# Patient Record
Sex: FEMALE | Employment: OTHER | URBAN - METROPOLITAN AREA
[De-identification: names, ages, dates, MRNs, and addresses within clinical notes are randomized per-mention and may not be internally consistent; named-entity substitution may affect disease eponyms.]

---

## 2017-01-13 DIAGNOSIS — M51.360 LUMBAR DISCOGENIC PAIN SYNDROME: ICD-10-CM

## 2017-01-13 DIAGNOSIS — I10 HYPERTENSION GOAL BP (BLOOD PRESSURE) < 140/90: ICD-10-CM

## 2017-01-13 DIAGNOSIS — E78.5 HYPERLIPIDEMIA LDL GOAL <100: Primary | ICD-10-CM

## 2017-01-13 RX ORDER — PRAVASTATIN SODIUM 40 MG
40 TABLET ORAL DAILY
Qty: 90 TABLET | Refills: 3 | Status: SHIPPED | OUTPATIENT
Start: 2017-01-13 | End: 2018-01-08

## 2017-01-13 RX ORDER — METOPROLOL SUCCINATE 200 MG/1
200 TABLET, EXTENDED RELEASE ORAL DAILY
Qty: 90 TABLET | Refills: 3 | Status: SHIPPED | OUTPATIENT
Start: 2017-01-13 | End: 2018-01-08

## 2017-01-13 RX ORDER — GABAPENTIN 100 MG/1
100 CAPSULE ORAL AT BEDTIME
Qty: 90 CAPSULE | Refills: 3 | Status: SHIPPED | OUTPATIENT
Start: 2017-01-13 | End: 2018-01-08

## 2017-01-13 NOTE — TELEPHONE ENCOUNTER
Gabapentin 100mg      Last Written Prescription Date:  11/02/15  Last Fill Quantity: 90,   # refills: 3  Last Office Visit with [facility], [facility] or [employer] prescribing provider: 06/20/16  Future Office visit:       Routing refill request to provider for review/approval because:  Drug not on the [facility], [facility] or [employer] refill protocol or controlled substance    Metoprolol 200mg      Last Written Prescription Date: 11/02/15  Last Fill Quantity: 90, # refills: 3    Last Office Visit with [facility], [facility] or [employer] prescribing provider:  06/20/16   Future Office Visit:        BP Readings from Last 3 Encounters:   06/20/16 128/77   02/01/16 128/76   11/02/15 138/88       Pravastatin 40mg     Last Written Prescription Date: 11/02/15  Last Fill Quantity: 90, # refills: 3  Last Office Visit with [employer], [facility] or [employer] prescribing provider: 06/20/16       CHOL      170   11/2/2015  HDL       45   11/2/2015  LDL       73   11/2/2015  TRIG      258   11/2/2015  CHOLHDLRATIO      3.8   11/2/2015        Thank you -  Gelacio Curry, Pharmacy Technician  [facility]  On Behalf Of [facility]

## 2017-03-06 DIAGNOSIS — I10 HYPERTENSION GOAL BP (BLOOD PRESSURE) < 140/90: ICD-10-CM

## 2017-03-06 NOTE — TELEPHONE ENCOUNTER
losartan-hydrochlorothiazide (HYZAAR) 100-12.5 MG per tablet      Last Written Prescription Date: 12/9/16  Last Fill Quantity: 90, # refills: 0  Last Office Visit with [facility], [facility] or [facility] prescribing provider: 6/20/16       Potassium   Date Value Ref Range Status   06/20/2016 4.4 3.4 - 5.3 mmol/L Final     Creatinine   Date Value Ref Range Status   06/20/2016 0.56 0.52 - 1.04 mg/dL Final     BP Readings from Last 3 Encounters:   06/20/16 128/77   02/01/16 128/76   11/02/15 138/88

## 2017-03-08 RX ORDER — LOSARTAN POTASSIUM AND HYDROCHLOROTHIAZIDE 12.5; 1 MG/1; MG/1
TABLET ORAL
Qty: 90 TABLET | Refills: 0 | Status: SHIPPED | OUTPATIENT
Start: 2017-03-08 | End: 2017-03-28

## 2017-03-20 ENCOUNTER — TELEPHONE (OUTPATIENT)
Dept: FAMILY MEDICINE | Facility: CLINIC | Age: 82
End: 2017-03-20

## 2017-03-20 DIAGNOSIS — R05.9 COUGH: ICD-10-CM

## 2017-03-20 RX ORDER — NORTRIPTYLINE HCL 25 MG
25 CAPSULE ORAL 2 TIMES DAILY
Qty: 180 CAPSULE | Refills: 3 | Status: CANCELLED | OUTPATIENT
Start: 2017-03-20

## 2017-03-20 NOTE — TELEPHONE ENCOUNTER
I did tell patient that Dr. Morgan has retired and that she might need to be seen for her medication, but am hoping you can refill one more time. Thanks  Nortriptyline    Last Written Prescription Date: 2/1/16  Last Fill Quantity: 90, # refills: 3  Last Office Visit with [facility] or [facility] prescribing provider: 6/20/16        BP Readings from Last 3 Encounters:   06/20/16 128/77   02/01/16 128/76   11/02/15 138/88     Pulse: (for Fetzima)  Creatinine   Date Value Ref Range Status   06/20/2016 0.56 0.52 - 1.04 mg/dL Final   ]    Last PHQ-9 score on record=   PHQ-9 SCORE 11/19/2012   Total Score 3     Thanks!  [facility]  [phone]

## 2017-03-20 NOTE — TELEPHONE ENCOUNTER
nortriptyline (PAMELOR) 25 MG capsule     Last Written Prescription Date: 2-1-16  Last Fill Quantity: 180, # refills: 3  Last Office Visit with [facility], [facility] or [facility] prescribing provider: 6-20-16        BP Readings from Last 3 Encounters:   06/20/16 128/77   02/01/16 128/76   11/02/15 138/88     Last PHQ-9 score on record=   PHQ-9 SCORE 11/19/2012   Total Score 3

## 2017-03-22 RX ORDER — NORTRIPTYLINE HCL 25 MG
25 CAPSULE ORAL 2 TIMES DAILY
Qty: 30 CAPSULE | Refills: 0 | Status: SHIPPED | OUTPATIENT
Start: 2017-03-22 | End: 2017-03-28

## 2017-03-22 NOTE — TELEPHONE ENCOUNTER
Duplicate request.   Cynthia Gant RN   March 22, 2017 11:04 AM  [facility]   [phone]

## 2017-03-22 NOTE — TELEPHONE ENCOUNTER
Pt needs to be seen for establish care with new provider and f/u on chronic medical conditions.    Medication refilled for 15 days.      Jennifer Nava MD.   Family Physician.  [facility].

## 2017-03-28 ENCOUNTER — OFFICE VISIT (OUTPATIENT)
Dept: FAMILY MEDICINE | Facility: CLINIC | Age: 82
End: 2017-03-28
Payer: COMMERCIAL

## 2017-03-28 VITALS
TEMPERATURE: 97.9 F | HEART RATE: 116 BPM | HEIGHT: 56 IN | WEIGHT: 150 LBS | BODY MASS INDEX: 33.74 KG/M2 | DIASTOLIC BLOOD PRESSURE: 89 MMHG | SYSTOLIC BLOOD PRESSURE: 139 MMHG | OXYGEN SATURATION: 97 %

## 2017-03-28 DIAGNOSIS — E78.5 HYPERLIPIDEMIA LDL GOAL <100: ICD-10-CM

## 2017-03-28 DIAGNOSIS — E11.9 TYPE 2 DIABETES MELLITUS WITHOUT COMPLICATION, WITHOUT LONG-TERM CURRENT USE OF INSULIN (H): ICD-10-CM

## 2017-03-28 DIAGNOSIS — Z91.89 PNEUMOCOCCAL VACCINATION INDICATED: ICD-10-CM

## 2017-03-28 DIAGNOSIS — R20.0 NUMBNESS OF LEFT HAND: ICD-10-CM

## 2017-03-28 DIAGNOSIS — R53.83 FATIGUE, UNSPECIFIED TYPE: ICD-10-CM

## 2017-03-28 DIAGNOSIS — I10 HYPERTENSION GOAL BP (BLOOD PRESSURE) < 140/90: Primary | ICD-10-CM

## 2017-03-28 DIAGNOSIS — R05.9 COUGH: ICD-10-CM

## 2017-03-28 LAB
ALBUMIN SERPL-MCNC: 3.7 G/DL (ref 3.4–5)
ALP SERPL-CCNC: 86 U/L (ref 40–150)
ALT SERPL W P-5'-P-CCNC: 36 U/L (ref 0–50)
ANION GAP SERPL CALCULATED.3IONS-SCNC: 6 MMOL/L (ref 3–14)
AST SERPL W P-5'-P-CCNC: 24 U/L (ref 0–45)
BASOPHILS # BLD AUTO: 0 10E9/L (ref 0–0.2)
BASOPHILS NFR BLD AUTO: 0.1 %
BILIRUB SERPL-MCNC: 0.4 MG/DL (ref 0.2–1.3)
BUN SERPL-MCNC: 17 MG/DL (ref 7–30)
CALCIUM SERPL-MCNC: 9.5 MG/DL (ref 8.5–10.1)
CHLORIDE SERPL-SCNC: 102 MMOL/L (ref 94–109)
CHOLEST SERPL-MCNC: 193 MG/DL
CO2 SERPL-SCNC: 30 MMOL/L (ref 20–32)
CREAT SERPL-MCNC: 0.6 MG/DL (ref 0.52–1.04)
CREAT UR-MCNC: 106 MG/DL
DIFFERENTIAL METHOD BLD: ABNORMAL
EOSINOPHIL # BLD AUTO: 0.1 10E9/L (ref 0–0.7)
EOSINOPHIL NFR BLD AUTO: 1.5 %
ERYTHROCYTE [DISTWIDTH] IN BLOOD BY AUTOMATED COUNT: 18.2 % (ref 10–15)
GFR SERPL CREATININE-BSD FRML MDRD: ABNORMAL ML/MIN/1.7M2
GLUCOSE SERPL-MCNC: 138 MG/DL (ref 70–99)
HBA1C MFR BLD: 6.7 % (ref 4.3–6)
HCT VFR BLD AUTO: 40.2 % (ref 35–47)
HDLC SERPL-MCNC: 50 MG/DL
HGB BLD-MCNC: 13.5 G/DL (ref 11.7–15.7)
LDLC SERPL CALC-MCNC: 92 MG/DL
LYMPHOCYTES # BLD AUTO: 2.7 10E9/L (ref 0.8–5.3)
LYMPHOCYTES NFR BLD AUTO: 36.3 %
MCH RBC QN AUTO: 21.8 PG (ref 26.5–33)
MCHC RBC AUTO-ENTMCNC: 33.6 G/DL (ref 31.5–36.5)
MCV RBC AUTO: 65 FL (ref 78–100)
MICROALBUMIN UR-MCNC: 16 MG/L
MICROALBUMIN/CREAT UR: 14.62 MG/G CR (ref 0–25)
MONOCYTES # BLD AUTO: 0.6 10E9/L (ref 0–1.3)
MONOCYTES NFR BLD AUTO: 7.9 %
NEUTROPHILS # BLD AUTO: 4.1 10E9/L (ref 1.6–8.3)
NEUTROPHILS NFR BLD AUTO: 54.2 %
NONHDLC SERPL-MCNC: 143 MG/DL
PLATELET # BLD AUTO: 314 10E9/L (ref 150–450)
POTASSIUM SERPL-SCNC: 4.3 MMOL/L (ref 3.4–5.3)
PROT SERPL-MCNC: 7.9 G/DL (ref 6.8–8.8)
RBC # BLD AUTO: 6.2 10E12/L (ref 3.8–5.2)
SODIUM SERPL-SCNC: 138 MMOL/L (ref 133–144)
TRIGL SERPL-MCNC: 254 MG/DL
TSH SERPL DL<=0.005 MIU/L-ACNC: 3.7 MU/L (ref 0.4–4)
WBC # BLD AUTO: 7.5 10E9/L (ref 4–11)

## 2017-03-28 PROCEDURE — 36415 COLL VENOUS BLD VENIPUNCTURE: CPT | Performed by: FAMILY MEDICINE

## 2017-03-28 PROCEDURE — 85025 COMPLETE CBC W/AUTO DIFF WBC: CPT | Performed by: FAMILY MEDICINE

## 2017-03-28 PROCEDURE — 80053 COMPREHEN METABOLIC PANEL: CPT | Performed by: FAMILY MEDICINE

## 2017-03-28 PROCEDURE — 84443 ASSAY THYROID STIM HORMONE: CPT | Performed by: FAMILY MEDICINE

## 2017-03-28 PROCEDURE — 82043 UR ALBUMIN QUANTITATIVE: CPT | Performed by: FAMILY MEDICINE

## 2017-03-28 PROCEDURE — 99214 OFFICE O/P EST MOD 30 MIN: CPT | Mod: 25 | Performed by: FAMILY MEDICINE

## 2017-03-28 PROCEDURE — 99207 C FOOT EXAM  NO CHARGE: CPT | Mod: 25 | Performed by: FAMILY MEDICINE

## 2017-03-28 PROCEDURE — G0009 ADMIN PNEUMOCOCCAL VACCINE: HCPCS | Performed by: FAMILY MEDICINE

## 2017-03-28 PROCEDURE — 80061 LIPID PANEL: CPT | Performed by: FAMILY MEDICINE

## 2017-03-28 PROCEDURE — 90670 PCV13 VACCINE IM: CPT | Performed by: FAMILY MEDICINE

## 2017-03-28 PROCEDURE — 83036 HEMOGLOBIN GLYCOSYLATED A1C: CPT | Performed by: FAMILY MEDICINE

## 2017-03-28 RX ORDER — LOSARTAN POTASSIUM AND HYDROCHLOROTHIAZIDE 12.5; 1 MG/1; MG/1
TABLET ORAL
Qty: 90 TABLET | Refills: 1 | Status: SHIPPED | OUTPATIENT
Start: 2017-03-28 | End: 2017-12-04

## 2017-03-28 ASSESSMENT — PAIN SCALES - GENERAL: PAINLEVEL: NO PAIN (0)

## 2017-03-28 NOTE — PROGRESS NOTES
SUBJECTIVE:                                                    David Ng is a 82 year old female who presents to clinic today for the following health issues:       Establish care         Problem list and histories reviewed & adjusted, as indicated.  Additional history: as documented         Reviewed and updated as needed this visit by clinical staff  Tobacco  Allergies  Meds  Problems  Med Hx  Surg Hx  Fam Hx  Soc Hx        Reviewed and updated as needed this visit by Provider            No chest pain/ breathing problems    Taking the nortriptyline for cough, not helping at all    Been on it for 2 years    No heartburn/ acid    No walking  / exercise    Patient occasionally gets some left hand/ arm numbness.  Never with exertion        Physical Exam   Constitutional: She is oriented to person, place, and time and well-developed, well-nourished, and in no distress. No distress.   HENT:   Head: Normocephalic and atraumatic.   Neck: Carotid bruit is not present.   Cardiovascular: Normal rate, regular rhythm, normal heart sounds and intact distal pulses.  Exam reveals no gallop and no friction rub.    No murmur heard.  Pulmonary/Chest: Effort normal and breath sounds normal.   Abdominal: Soft. There is no tenderness.   Musculoskeletal: She exhibits no edema.   Neurological: She is alert and oriented to person, place, and time.   Skin: She is not diaphoretic.   Psychiatric: Mood and affect normal.     Good radial pulses    Neg phalens and tinels    Good sensation and strength in both distal arms    Neck range of motion okay, no radiculopathy       ASSESSMENT / PLAN:  (I10) Hypertension goal BP (blood pressure) < 140/90  (primary encounter diagnosis)  Comment: barely at goal.   Plan: losartan-hydrochlorothiazide (HYZAAR) 100-12.5         MG per tablet        Stay on same meds. Also advised some increase in exercise/ walking     (E11.9) Type 2 diabetes mellitus without complication, without long-term current  use of insulin (H)  Comment: check lab.  On metformin  Plan: FOOT EXAM, OPTOMETRY REFERRAL, Albumin Random         Urine Quantitative, Hemoglobin A1c             (Z91.89) Pneumococcal vaccination indicated  Comment: patient okay with getting this   Plan: PNEUMOCOCCAL CONJ VACCINE 13 VALENT IM, VACCINE        ADMINISTRATION, INITIAL             (R53.83) Fatigue, unspecified type  Comment: check   Plan: TSH with free T4 reflex, CBC with platelets         differential             (E78.5) Hyperlipidemia LDL goal <100  Comment: fasting today   Plan: Lipid panel reflex to direct LDL, Comprehensive        metabolic panel             (R20.8) Numbness of left hand  Comment: exam reassuring.  May just be positional decrease in blood flow as holding elbow in extreme flexion sets it off   Plan: monitor     (R05) Cough  Comment: the nortriptyline did not help this at all; will dc this   Plan: dry mouth may have something to do with cough.  Monitor symptoms.  Stay well hydrated       I reviewed the patient's medications, allergies, medical history, family history, and social history.    Jayden Munoz MD

## 2017-03-28 NOTE — LETTER
[facility]  [address] [place]  [address]  [phone]        March 29, 2017    David Ng  [address] [place] [address]        Dear David,    Your labs are all very stable.  Stay on same medications.     Results for orders placed or performed in visit on 03/28/17   Albumin Random Urine Quantitative   Result Value Ref Range    Creatinine Urine 106 mg/dL    Albumin Urine mg/L 16 mg/L    Albumin Urine mg/g Cr 14.62 0 - 25 mg/g Cr   Lipid panel reflex to direct LDL   Result Value Ref Range    Cholesterol 193 <200 mg/dL    Triglycerides 254 (H) <150 mg/dL    HDL Cholesterol 50 >49 mg/dL    LDL Cholesterol Calculated 92 <100 mg/dL    Non HDL Cholesterol 143 (H) <130 mg/dL   Comprehensive metabolic panel   Result Value Ref Range    Sodium 138 133 - 144 mmol/L    Potassium 4.3 3.4 - 5.3 mmol/L    Chloride 102 94 - 109 mmol/L    Carbon Dioxide 30 20 - 32 mmol/L    Anion Gap 6 3 - 14 mmol/L    Glucose 138 (H) 70 - 99 mg/dL    Urea Nitrogen 17 7 - 30 mg/dL    Creatinine 0.60 0.52 - 1.04 mg/dL    GFR Estimate >90  Non  GFR Calc   >60 mL/min/1.7m2    GFR Estimate If Black >90   GFR Calc   >60 mL/min/1.7m2    Calcium 9.5 8.5 - 10.1 mg/dL    Bilirubin Total 0.4 0.2 - 1.3 mg/dL    Albumin 3.7 3.4 - 5.0 g/dL    Protein Total 7.9 6.8 - 8.8 g/dL    Alkaline Phosphatase 86 40 - 150 U/L    ALT 36 0 - 50 U/L    AST 24 0 - 45 U/L   TSH with free T4 reflex   Result Value Ref Range    TSH 3.70 0.40 - 4.00 mU/L   CBC with platelets differential   Result Value Ref Range    WBC 7.5 4.0 - 11.0 10e9/L    RBC Count 6.20 (H) 3.8 - 5.2 10e12/L    Hemoglobin 13.5 11.7 - 15.7 g/dL    Hematocrit 40.2 35.0 - 47.0 %    MCV 65 (L) 78 - 100 fl    MCH 21.8 (L) 26.5 - 33.0 pg    MCHC 33.6 31.5 - 36.5 g/dL    RDW 18.2 (H) 10.0 - 15.0 %    Platelet Count 314 150 - 450 10e9/L    Diff Method Automated Method     % Neutrophils 54.2 %    % Lymphocytes 36.3 %    %  Monocytes 7.9 %    % Eosinophils 1.5 %    % Basophils 0.1 %    Absolute Neutrophil 4.1 1.6 - 8.3 10e9/L    Absolute Lymphocytes 2.7 0.8 - 5.3 10e9/L    Absolute Monocytes 0.6 0.0 - 1.3 10e9/L    Absolute Eosinophils 0.1 0.0 - 0.7 10e9/L    Absolute Basophils 0.0 0.0 - 0.2 10e9/L   Hemoglobin A1c   Result Value Ref Range    Hemoglobin A1C 6.7 (H) 4.3 - 6.0 %       If you have any questions please call the clinic at [phone].    Sincerely,    Jayden BHAGATL

## 2017-03-28 NOTE — NURSING NOTE
"Chief Complaint   Patient presents with     [facility]     Health Maintenance       Initial /89 (BP Location: Left arm, Patient Position: Chair, Cuff Size: Adult Regular)  Pulse 116  Temp 97.9  F (36.6  C) (Oral)  Ht 4' 7.5\" (1.41 m)  Wt 150 lb (68 kg)  SpO2 97%  Breastfeeding? No  BMI 34.24 kg/m2 Estimated body mass index is 34.24 kg/(m^2) as calculated from the following:    Height as of this encounter: 4' 7.5\" (1.41 m).    Weight as of this encounter: 150 lb (68 kg).  Medication Reconciliation: complete   Cynthia Macdonald CMA      "

## 2017-03-28 NOTE — NURSING NOTE
Screening Questionnaire for Adult Immunization   Are you sick today?   No    Do you have allergies to medications, food, a vaccine component or latex?   No    Have you ever had a serious reaction after receiving a vaccination?   No    Do you have a long-term health problem with heart disease, lung disease,  asthma, kidney disease, metabolic (e.g., diabetes), anemia, or other blood disorder?   No    Do you have cancer, leukemia,HIV/ AIDS, or any immune system problem?   No       In the past 3 months, have you taken medications that weaken your immune system, such as cortisone, prednisone, other steroids, or anticancer drugs, or have you had radiation treatments?   No    Have you had a seizure or a brain, or other nervous system problem?   No    During the past year, have you received a transfusion of blood or blood       products, or been given a  immune (gamma) globulin or an antiviral drug?   No    For women: Are you pregnant or is there a chance you could become         pregnant during the next month?   No    Have you received any vaccinations in the past 4 weeks?   No     Immunization questionnaire answers were all negative.     VIS for Pneumococcal 13 given on same date of administration.  Staff signature/Title: Vianey Bejarano MA    Patient/Patient's parent was advised to wait 20 minutes after injection(s).  Vianey Bejarano MA

## 2017-03-28 NOTE — PATIENT INSTRUCTIONS
Increase fluid intake    For the nortriptyline, go to one pill daily for one week, then go off    Continue other meds as is    We will send you lab results    Increase walking/ exercise as you are able

## 2017-03-28 NOTE — MR AVS SNAPSHOT
After Visit Summary   3/28/2017    David Ng    MRN: [id number]           Patient Information     Date Of Birth          8/24/1934        Visit Information        Provider Department      3/28/2017 8:40 AM Jayden Munoz MD [facility]        Today's Diagnoses     Pneumococcal vaccination indicated    -  1    Type 2 diabetes mellitus without complication, without long-term current use of insulin (H)        Hypertension goal BP (blood pressure) < 140/90        Fatigue, unspecified type        Hyperlipidemia LDL goal <100          Care Instructions    Increase fluid intake    For the nortriptyline, go to one pill daily for one week, then go off    Continue other meds as is    We will send you lab results    Increase walking/ exercise as you are able        Follow-ups after your visit        Additional Services     OPTOMETRY REFERRAL       Your provider has referred you to:  [facility]: [facility] Alex Shine [phone]   [email or web address]      Please be aware that coverage of these services is subject to the terms and limitations of your health insurance plan.  Call member services at your health plan with any benefit or coverage questions.      Please bring the following to your appointment:  >>   Any x-rays, CTs or MRIs which have been performed.  Contact the facility where they were done to arrange for  prior to your scheduled appointment.  Any new CT, MRI or other procedures ordered by your specialist must be performed at a [facility] facility or coordinated by your clinic's referral office.    >>   List of current medications   >>   This referral request   >>   Any documents/labs given to you for this referral                  Who to contact     If you have questions or need follow up information about today's clinic visit or your schedule please contact [facility] directly at [phone].  Normal or  "non-critical lab and imaging results will be communicated to you by MyChart, letter or phone within 4 business days after the clinic has received the results. If you do not hear from us within 7 days, please contact the clinic through [employer] or phone. If you have a critical or abnormal lab result, we will notify you by phone as soon as possible.  Submit refill requests through Wurl or call your pharmacy and they will forward the refill request to us. Please allow 3 business days for your refill to be completed.          Additional Information About Your Visit        Wurl Information     Wurl lets you send messages to your doctor, view your test results, renew your prescriptions, schedule appointments and more. To sign up, go to [email or web address] . Click on \"Log in\" on the left side of the screen, which will take you to the Welcome page. Then click on \"Sign up Now\" on the right side of the page.     You will be asked to enter the access code listed below, as well as some personal information. Please follow the directions to create your username and password.     Your access code is: [email or web address]  Expires: 2017  9:21 AM     Your access code will  in 90 days. If you need help or a new code, please call your [facility] clinic or [phone].        Care EveryWhere ID     This is your Care EveryWhere ID. This could be used by other organizations to access your [facility] medical records  [id number]        Your Vitals Were     Pulse Temperature Height Pulse Oximetry Breastfeeding? BMI (Body Mass Index)    116 97.9  F (36.6  C) (Oral) 4' 7.5\" (1.41 m) 97% No 34.24 kg/m2       Blood Pressure from Last 3 Encounters:   17 139/89   16 128/77   16 128/76    Weight from Last 3 Encounters:   17 150 lb (68 kg)   16 146 lb (66.2 kg)   16 154 lb (69.9 kg)              We Performed the Following     Albumin Random Urine Quantitative     CBC with platelets differential     " Comprehensive metabolic panel     FOOT EXAM     Hemoglobin A1c     Lipid panel reflex to direct LDL     OPTOMETRY REFERRAL     PNEUMOCOCCAL CONJ VACCINE 13 VALENT IM     TSH with free T4 reflex     VACCINE ADMINISTRATION, INITIAL          Today's Medication Changes          These changes are accurate as of: 3/28/17  9:21 AM.  If you have any questions, ask your nurse or doctor.               These medicines have changed or have updated prescriptions.        Dose/Directions    losartan-hydrochlorothiazide 100-12.5 MG per tablet   Commonly known as:  HYZAAR   This may have changed:  See the new instructions.   Used for:  Hypertension goal BP (blood pressure) < 140/90   Changed by:  Jayden Munoz MD        1 po daily   Quantity:  90 tablet   Refills:  1         Stop taking these medicines if you haven't already. Please contact your care team if you have questions.     cyclobenzaprine 5 MG tablet   Commonly known as:  FLEXERIL   Stopped by:  Jayden Munoz MD           nortriptyline 25 MG capsule   Commonly known as:  PAMELOR   Stopped by:  Jayden Munoz MD                Where to get your medicines      These medications were sent to [facility] [address]  [address] [facility] [address]     Phone:  [phone]     losartan-hydrochlorothiazide 100-12.5 MG per tablet                Primary Care Provider Office Phone # Fax #    Jayden Munoz [phone]       [facility] [address] [facility] [address]        Thank you!     Thank you for choosing [facility]  for your care. Our goal is always to provide you with excellent care. Hearing back from our patients is one way we can continue to improve our services. Please take a few minutes to complete the written survey that you may receive in the mail after your visit with us. Thank you!             Your Updated  Medication List - Protect others around you: Learn how to safely use, store and throw away your medicines at www.disposemymeds.org.          This list is accurate as of: 3/28/17  9:21 AM.  Always use your most recent med list.                   Brand Name Dispense Instructions for use    acetaminophen 500 MG tablet    TYLENOL    100 tablet    Take 2 tablets by mouth 2 times daily.       ALEVE PO          amLODIPine 10 MG tablet    NORVASC    90 tablet    Take 1 tablet (10 mg) by mouth daily       aspirin 325 MG EC tablet      Take 1-2 tablets by mouth daily as needed.       calcium carbonate-vitamin D 600-200 MG-UNIT Tabs    calcium + D    200 tablet    Take 2 tablets by mouth daily.       cholecalciferol 400 UNIT Tabs tablet    vitamin D    100 tablet    Take 1 tablet by mouth daily.       gabapentin 100 MG capsule    NEURONTIN    90 capsule    Take 1 capsule (100 mg) by mouth At Bedtime       glucosamine 500 MG Tabs      1 TABLET DAILY       losartan-hydrochlorothiazide 100-12.5 MG per tablet    HYZAAR    90 tablet    1 po daily       metFORMIN 750 MG 24 hr tablet    GLUCOPHAGE-XR    180 tablet    1 tablet daily with dinner for 1 week, then 2 tablets daily with dinner.       metoprolol 200 MG 24 hr tablet    TOPROL-XL    90 tablet    Take 1 tablet (200 mg) by mouth daily       pravastatin 40 MG tablet    PRAVACHOL    90 tablet    Take 1 tablet (40 mg) by mouth daily

## 2017-07-27 ENCOUNTER — OFFICE VISIT (OUTPATIENT)
Dept: OPTOMETRY | Facility: CLINIC | Age: 82
End: 2017-07-27
Payer: COMMERCIAL

## 2017-07-27 DIAGNOSIS — H52.4 MYOPIA OF BOTH EYES WITH ASTIGMATISM AND PRESBYOPIA: ICD-10-CM

## 2017-07-27 DIAGNOSIS — H25.813 COMBINED FORMS OF AGE-RELATED CATARACT OF BOTH EYES: ICD-10-CM

## 2017-07-27 DIAGNOSIS — H52.203 MYOPIA OF BOTH EYES WITH ASTIGMATISM AND PRESBYOPIA: ICD-10-CM

## 2017-07-27 DIAGNOSIS — H35.363 MACULAR DRUSEN, BILATERAL: ICD-10-CM

## 2017-07-27 DIAGNOSIS — E11.9 TYPE 2 DIABETES MELLITUS WITHOUT RETINOPATHY (H): Primary | ICD-10-CM

## 2017-07-27 DIAGNOSIS — H52.13 MYOPIA OF BOTH EYES WITH ASTIGMATISM AND PRESBYOPIA: ICD-10-CM

## 2017-07-27 PROCEDURE — 92015 DETERMINE REFRACTIVE STATE: CPT | Performed by: OPTOMETRIST

## 2017-07-27 PROCEDURE — 92014 COMPRE OPH EXAM EST PT 1/>: CPT | Performed by: OPTOMETRIST

## 2017-07-27 ASSESSMENT — VISUAL ACUITY
OD_SC: 20/200
OS_CC+: -1
OS_CC: 20/120
OD_SC: 20/60
OD_CC: 20/60
OS_SC: 20/60
METHOD: SNELLEN - LINEAR
CORRECTION_TYPE: GLASSES
OS_SC: 20/80
OD_CC: 20/25
OS_SC+: -2
OS_CC: 20/30

## 2017-07-27 ASSESSMENT — CONF VISUAL FIELD
OD_NORMAL: 1
OS_NORMAL: 1

## 2017-07-27 ASSESSMENT — REFRACTION_MANIFEST
OD_AXIS: 005
OD_SPHERE: -2.25
OD_ADD: +3.00
OS_AXIS: 180
OS_ADD: +3.00
OS_SPHERE: -2.00
OD_CYLINDER: +1.50
OS_CYLINDER: +1.75

## 2017-07-27 ASSESSMENT — REFRACTION_WEARINGRX
OD_SPHERE: -2.00
OS_AXIS: 180
OD_CYLINDER: +1.50
OS_ADD: +2.75
OS_CYLINDER: +1.50
OS_SPHERE: -2.00
OD_ADD: +2.75
OD_AXIS: 180

## 2017-07-27 ASSESSMENT — SLIT LAMP EXAM - LIDS
COMMENTS: NORMAL
COMMENTS: NORMAL

## 2017-07-27 ASSESSMENT — CUP TO DISC RATIO
OD_RATIO: 0.3
OS_RATIO: 0.3

## 2017-07-27 ASSESSMENT — TONOMETRY
OS_IOP_MMHG: 14
OD_IOP_MMHG: 16
IOP_METHOD: APPLANATION

## 2017-07-27 ASSESSMENT — EXTERNAL EXAM - LEFT EYE: OS_EXAM: NORMAL

## 2017-07-27 ASSESSMENT — EXTERNAL EXAM - RIGHT EYE: OD_EXAM: NORMAL

## 2017-07-27 NOTE — PROGRESS NOTES
Chief Complaint   Patient presents with     Diabetic Eye Exam     Accompanied by self  Lab Results   Component Value Date    A1C 6.7 03/28/2017    A1C 6.7 06/20/2016    A1C 7.4 11/02/2015    A1C 7.2 12/29/2014    A1C 6.9 11/01/2013       Last Eye Exam: 2.5 years ago  Dilated Previously: Yes    What are you currently using to see?  glasses    Distance Vision Acuity: Satisfied with vision    Near Vision Acuity: Satisfied with vision while reading  with glasses    Eye Comfort: good  Do you use eye drops? : No  Occupation or Hobbies: retired    Elvia Nino, [employer]     Medical, surgical and family histories reviewed and updated 7/27/2017.       OBJECTIVE: See Ophthalmology exam    ASSESSMENT:    ICD-10-CM    1. Type 2 diabetes mellitus without retinopathy (H) E11.9 EYE EXAM (SIMPLE-NONBILLABLE)   2. Combined forms of age-related cataract of both eyes H25.813 EYE EXAM (SIMPLE-NONBILLABLE)   3. Macular drusen, bilateral H35.363 EYE EXAM (SIMPLE-NONBILLABLE)   4. Myopia of both eyes with astigmatism and presbyopia H52.13 EYE EXAM (SIMPLE-NONBILLABLE)    H52.203 REFRACTION    H52.4       PLAN:  Monitor, Keep blood sugar under control  Sent letter to primary care provider regarding diabetes.  David Ng aware  eye exam results will be sent to Jayden Munoz  Monitor cataracts by having yearly exams.  Wear sunglasses when outside.  Eat a good healthy diet with lots of green leafy vegetables, colorful fruits and vegetables, and nuts.  Wear sunglasses when outside.  A final glasses prescription was given.  The prescription change is very mild so getting new glasses is optional.  If you do decide to get new glasses, allow time for adaptation.  The glasses may cause dizziness and affect depth perception for awhile.  Return to clinic 1 year for Comprehensive Vision Exam      Sofie Alonzo O.D  [address] [place]  [address] [phone]

## 2017-07-27 NOTE — LETTER
[facility]  Optometry Department      7/27/2017    Re:  David Ng  8/24/1934   [id number]    Dear Dr. Munoz,    Your patient was seen in our office on 7/27/2017 for a dilated diabetic eye exam.      Findings:    No Retinopathy  OU - Both  Cataracts both eyes  Macular drusen both eyes      Comments:  David should return for a comprehensive eye exam in 1 year.    Sincerely,        Sofie Alonzo O.D  [facility] [address]  [place]  [address]    [phone]

## 2017-07-27 NOTE — MR AVS SNAPSHOT
After Visit Summary   7/27/2017    David Ng    MRN: [id number]           Patient Information     Date Of Birth          8/24/1934        Visit Information        Provider Department      7/27/2017 9:00 AM Sofie Alonzo OD [facility]        Today's Diagnoses     Type 2 diabetes mellitus without retinopathy (H)    -  1    Combined forms of age-related cataract of both eyes        Macular drusen, bilateral        Myopia of both eyes with astigmatism and presbyopia          Care Instructions        Monitor, Keep blood sugar under control  Sent letter to primary care provider regarding diabetes  Monitor cataracts by having yearly exams.  Wear sunglasses when outside.  Eat a good healthy diet with lots of green leafy vegetables, colorful fruits and vegetables, and nuts.  Wear sunglasses when outside.  A final glasses prescription was given.  The prescription change is very mild so getting new glasses is optional.  If you do decide to get new glasses, allow time for adaptation.  The glasses may cause dizziness and affect depth perception for awhile.  Return to clinic 1 year for Comprehensive Vision Exam      Sofie Alonzo O.D  [address] [place]  [address]    [phone]                Follow-ups after your visit        Follow-up notes from your care team     Return in about 1 year (around 7/27/2018) for Eye Exam.      Who to contact     If you have questions or need follow up information about today's clinic visit or your schedule please contact [facility] directly at [phone].  Normal or non-critical lab and imaging results will be communicated to you by MyChart, letter or phone within 4 business days after the clinic has received the results. If you do not hear from us within 7 days, please contact the clinic through MyChart or phone. If you have a critical or abnormal lab result, we will notify you by phone as soon as  "possible.  Submit refill requests through [employer] or call your pharmacy and they will forward the refill request to us. Please allow 3 business days for your refill to be completed.          Additional Information About Your Visit        [employer] Information     [employer] lets you send messages to your doctor, view your test results, renew your prescriptions, schedule appointments and more. To sign up, go to [email or web address] . Click on \"Log in\" on the left side of the screen, which will take you to the Welcome page. Then click on \"Sign up Now\" on the right side of the page.     You will be asked to enter the access code listed below, as well as some personal information. Please follow the directions to create your username and password.     Your access code is: [email or web address]  Expires: 10/25/2017 10:05 AM     Your access code will  in 90 days. If you need help or a new code, please call your [place] clinic or [phone].        Care EveryWhere ID     This is your Care EveryWhere ID. This could be used by other organizations to access your [place] medical records  [id number]         Blood Pressure from Last 3 Encounters:   17 139/89   16 128/77   16 128/76    Weight from Last 3 Encounters:   17 68 kg (150 lb)   16 66.2 kg (146 lb)   16 69.9 kg (154 lb)              We Performed the Following     EYE EXAM (SIMPLE-NONBILLABLE)     REFRACTION        Primary Care Provider Office Phone # Fax #    Jayden Munoz [phone]       [facility] [address] [facility] [address]        Equal Access to Services     [facility] : Hadii randal levy Soestee, waaxda luqadaha, qaybta kaalmada jesus, melissa ford. So [facility] [phone].    ATENCIÓN: Si habla español, tiene a moe disposición servicios gratuitos de asistencia lingüística. Llame al [phone].    We comply with applicable federal civil " rights laws and [place] laws. We do not discriminate on the basis of race, color, national origin, age, disability sex, sexual orientation or gender identity.            Thank you!     Thank you for choosing [facility]  for your care. Our goal is always to provide you with excellent care. Hearing back from our patients is one way we can continue to improve our services. Please take a few minutes to complete the written survey that you may receive in the mail after your visit with us. Thank you!             Your Updated Medication List - Protect others around you: Learn how to safely use, store and throw away your medicines at www.disposemymeds.org.          This list is accurate as of: 7/27/17 10:05 AM.  Always use your most recent med list.                   Brand Name Dispense Instructions for use Diagnosis    acetaminophen 500 MG tablet    TYLENOL    100 tablet    Take 2 tablets by mouth 2 times daily.    DJD (degenerative joint disease), LBP (low back pain)       ALEVE PO           amLODIPine 10 MG tablet    NORVASC    90 tablet    Take 1 tablet (10 mg) by mouth daily    Hypertension goal BP (blood pressure) < 140/90       aspirin 325 MG EC tablet      Take 1-2 tablets by mouth daily as needed.        calcium carbonate-vitamin D 600-200 MG-UNIT Tabs    calcium + D    200 tablet    Take 2 tablets by mouth daily.    Osteoporosis, post-menopausal       cholecalciferol 400 UNIT Tabs tablet    vitamin D    100 tablet    Take 1 tablet by mouth daily.    Osteoporosis, post-menopausal       gabapentin 100 MG capsule    NEURONTIN    90 capsule    Take 1 capsule (100 mg) by mouth At Bedtime    Lumbar discogenic pain syndrome       glucosamine 500 MG Tabs      1 TABLET DAILY        losartan-hydrochlorothiazide 100-12.5 MG per tablet    HYZAAR    90 tablet    1 po daily    Hypertension goal BP (blood pressure) < 140/90       metFORMIN 750 MG 24 hr tablet    GLUCOPHAGE-XR    180 tablet    1 tablet daily with  dinner for 1 week, then 2 tablets daily with dinner.    Type 2 diabetes mellitus with hyperosmolarity without coma (H)       metoprolol 200 MG 24 hr tablet    TOPROL-XL    90 tablet    Take 1 tablet (200 mg) by mouth daily    Hypertension goal BP (blood pressure) < 140/90       pravastatin 40 MG tablet    PRAVACHOL    90 tablet    Take 1 tablet (40 mg) by mouth daily    Hyperlipidemia LDL goal <100

## 2017-07-27 NOTE — PATIENT INSTRUCTIONS
Monitor, Keep blood sugar under control  Sent letter to primary care provider regarding diabetes  Monitor cataracts by having yearly exams.  Wear sunglasses when outside.  Eat a good healthy diet with lots of green leafy vegetables, colorful fruits and vegetables, and nuts.  Wear sunglasses when outside.  A final glasses prescription was given.  The prescription change is very mild so getting new glasses is optional.  If you do decide to get new glasses, allow time for adaptation.  The glasses may cause dizziness and affect depth perception for awhile.  Return to clinic 1 year for Comprehensive Vision Exam      Sofie Alonzo O.D  [address] [place]  [address]    [phone]

## 2017-08-03 ENCOUNTER — OFFICE VISIT (OUTPATIENT)
Dept: FAMILY MEDICINE | Facility: CLINIC | Age: 82
End: 2017-08-03
Payer: COMMERCIAL

## 2017-08-03 VITALS
SYSTOLIC BLOOD PRESSURE: 130 MMHG | TEMPERATURE: 98.2 F | OXYGEN SATURATION: 95 % | DIASTOLIC BLOOD PRESSURE: 60 MMHG | WEIGHT: 148 LBS | BODY MASS INDEX: 29.06 KG/M2 | HEART RATE: 80 BPM | HEIGHT: 60 IN

## 2017-08-03 DIAGNOSIS — I10 HYPERTENSION GOAL BP (BLOOD PRESSURE) < 140/90: ICD-10-CM

## 2017-08-03 PROCEDURE — 99213 OFFICE O/P EST LOW 20 MIN: CPT | Performed by: FAMILY MEDICINE

## 2017-08-03 RX ORDER — AMLODIPINE BESYLATE 10 MG/1
10 TABLET ORAL DAILY
Qty: 90 TABLET | Refills: 3 | Status: SHIPPED | OUTPATIENT
Start: 2017-08-03 | End: 2018-03-06

## 2017-08-03 ASSESSMENT — PAIN SCALES - GENERAL: PAINLEVEL: NO PAIN (0)

## 2017-08-03 NOTE — MR AVS SNAPSHOT
"              After Visit Summary   8/3/2017    David Ng    MRN: [id number]           Patient Information     Date Of Birth          1934        Visit Information        Provider Department      8/3/2017 11:20 AM Rey Becker MD [facility]        Today's Diagnoses     Hypertension goal BP (blood pressure) < 140/90           Follow-ups after your visit        Who to contact     If you have questions or need follow up information about today's clinic visit or your schedule please contact [facility] directly at [phone].  Normal or non-critical lab and imaging results will be communicated to you by Postling[employer], letter or phone within 4 business days after the clinic has received the results. If you do not hear from us within 7 days, please contact the clinic through Postling[employer] or phone. If you have a critical or abnormal lab result, we will notify you by phone as soon as possible.  Submit refill requests through [employer] or call your pharmacy and they will forward the refill request to us. Please allow 3 business days for your refill to be completed.          Additional Information About Your Visit        MyChart Information     [employer] lets you send messages to your doctor, view your test results, renew your prescriptions, schedule appointments and more. To sign up, go to [email or web address] . Click on \"Log in\" on the left side of the screen, which will take you to the Welcome page. Then click on \"Sign up Now\" on the right side of the page.     You will be asked to enter the access code listed below, as well as some personal information. Please follow the directions to create your username and password.     Your access code is: [email or web address]  Expires: 10/25/2017 10:05 AM     Your access code will  in 90 days. If you need help or a new code, please call your [facility] or [phone].        Care EveryWhere ID     This is your Care EveryWhere " ID. This could be used by other organizations to access your [place] medical records  [id number]        Your Vitals Were     Pulse Temperature Height Pulse Oximetry BMI (Body Mass Index)       80 98.2  F (36.8  C) (Oral) 5' (1.524 m) 95% 28.9 kg/m2        Blood Pressure from Last 3 Encounters:   08/03/17 130/60   03/28/17 139/89   06/20/16 128/77    Weight from Last 3 Encounters:   08/03/17 148 lb (67.1 kg)   03/28/17 150 lb (68 kg)   06/20/16 146 lb (66.2 kg)              Today, you had the following     No orders found for display         Where to get your medicines      These medications were sent to [facility] - [place], [place] - [address]  [address], [facility] [address]     Phone:  [phone]     amLODIPine 10 MG tablet          Primary Care Provider Office Phone # Fax #    Jayden Munoz [phone]       [facility] [address] [place], [place]. \Hospital [facility] Outlook.\"" [id number]        Equal Access to Services     ROMERO NEWBY : Hadii aad ku hadasho Soomaali, waaxda luqadaha, qaybta kaalmada adeegyada, melissa mead . So [facility] [phone].    ATENCIÓN: Si habla español, tiene a moe disposición servicios gratuitos de asistencia lingüística. Llame al [phone].    We comply with applicable federal civil rights laws and [place] laws. We do not discriminate on the basis of race, color, national origin, age, disability sex, sexual orientation or gender identity.            Thank you!     Thank you for choosing [facility]  for your care. Our goal is always to provide you with excellent care. Hearing back from our patients is one way we can continue to improve our services. Please take a few minutes to complete the written survey that you may receive in the mail after your visit with us. Thank you!             Your Updated Medication List - Protect others around you: Learn how to safely  use, store and throw away your medicines at www.disposemymeds.org.          This list is accurate as of: 8/3/17 12:22 PM.  Always use your most recent med list.                   Brand Name Dispense Instructions for use Diagnosis    acetaminophen 500 MG tablet    TYLENOL    100 tablet    Take 2 tablets by mouth 2 times daily.    DJD (degenerative joint disease), LBP (low back pain)       ALEVE PO           amLODIPine 10 MG tablet    NORVASC    90 tablet    Take 1 tablet (10 mg) by mouth daily    Hypertension goal BP (blood pressure) < 140/90       aspirin 325 MG EC tablet      Take 1-2 tablets by mouth daily as needed.        calcium carbonate-vitamin D 600-200 MG-UNIT Tabs    calcium + D    200 tablet    Take 2 tablets by mouth daily.    Osteoporosis, post-menopausal       cholecalciferol 400 UNIT Tabs tablet    vitamin D    100 tablet    Take 1 tablet by mouth daily.    Osteoporosis, post-menopausal       gabapentin 100 MG capsule    NEURONTIN    90 capsule    Take 1 capsule (100 mg) by mouth At Bedtime    Lumbar discogenic pain syndrome       glucosamine 500 MG Tabs      1 TABLET DAILY        losartan-hydrochlorothiazide 100-12.5 MG per tablet    HYZAAR    90 tablet    1 po daily    Hypertension goal BP (blood pressure) < 140/90       metFORMIN 750 MG 24 hr tablet    GLUCOPHAGE-XR    180 tablet    1 tablet daily with dinner for 1 week, then 2 tablets daily with dinner.    Type 2 diabetes mellitus with hyperosmolarity without coma (H)       metoprolol 200 MG 24 hr tablet    TOPROL-XL    90 tablet    Take 1 tablet (200 mg) by mouth daily    Hypertension goal BP (blood pressure) < 140/90       pravastatin 40 MG tablet    PRAVACHOL    90 tablet    Take 1 tablet (40 mg) by mouth daily    Hyperlipidemia LDL goal <100

## 2017-08-03 NOTE — NURSING NOTE
Chief Complaint   Patient presents with     Patient Request     Swollen legs       Initial /86 (BP Location: Left arm, Patient Position: Chair, Cuff Size: Adult Large)  Pulse 80  Temp 98.2  F (36.8  C) (Oral)  Ht 5' (1.524 m)  Wt 148 lb (67.1 kg)  SpO2 95%  BMI 28.9 kg/m2 Estimated body mass index is 28.9 kg/(m^2) as calculated from the following:    Height as of this encounter: 5' (1.524 m).    Weight as of this encounter: 148 lb (67.1 kg).  Medication Reconciliation: complete   Rachana Grey MA

## 2017-08-03 NOTE — PROGRESS NOTES
SUBJECTIVE:                                                    David Ng is a 82 year old female who presents to clinic today for the following health issues:    Swelling in lower legs      Duration: Ongoing for a long time, worse in the last two weeks    Description (location/character/radiation): Swollen lower legs    Intensity:  No pain, swelling can go from mild to moderate    Accompanying signs and symptoms: None    History (similar episodes/previous evaluation): Ongoing    Precipitating or alleviating factors: Standing, walking, sitting in a fixed postion    Therapies tried and outcome: None       Rachana Grey MA      Review Of Systems  Skin: negative  Eyes: negative  Ears/Nose/Throat: negative  Respiratory: No shortness of breath, dyspnea on exertion, cough, or hemoptysis  Cardiovascular: negative  Gastrointestinal: negative  Genitourinary: negative  Musculoskeletal: negative  Neurologic: negative  Psychiatric: negative  Hematologic/Lymphatic/Immunologic: negative  Endocrine: negative      Patient states she has prediabetes     Past Medical History:   Diagnosis Date     Abnormal mammogram 3-96     Advanced directives, counseling/discussion 9/2/2011     Cough 7/25/2011     CTS (carpal tunnel syndrome) 3/13/2012     DJD (degenerative joint disease)      DM (diabetes mellitus), type 2, uncontrolled (H) 4/15/2010     HTN (hypertension) 4/15/2010     Hypercholesteremia 4/15/2010     Hyperlipidemia LDL goal <100 10/31/2010     Hypertension goal BP (blood pressure) < 140/90 7/24/2011     LBP (low back pain) 9/9/2011     Osteoporosis, post-menopausal 7/26/2011     Thoracic back pain 11/12/2012     Type 2 diabetes, HbA1C goal < 8% (H) 7/24/2011       Past Surgical History:   Procedure Laterality Date     D & C       HC FEMUR/KNEE SURG UNLISTED  8-    knee scope bilaterally     HEMORRHOIDECTOMY       ROTATOR CUFF REPAIR RT/LT      LT rotator cuff tear / Biceps tendod tear / glenoid labrum tear  / Acromioclavicular arthritis     VERTEBROPLASTY  2012    T12       Family History   Problem Relation Age of Onset     CEREBROVASCULAR DISEASE Mother 70     GASTROINTESTINAL DISEASE Father      liver problems     HEART DISEASE Brother 58      at 58, [place]     Glaucoma No family hx of      Macular Degeneration No family hx of      Her mother had diabetes     Social History   Substance Use Topics     Smoking status: Never Smoker     Smokeless tobacco: Never Used      Comment: No second hand exposure.     Alcohol use No     O: /60  Pulse 80  Temp 98.2  F (36.8  C) (Oral)  Ht 5' (1.524 m)  Wt 148 lb (67.1 kg)  SpO2 95%  BMI 28.9 kg/m2    In NAD     .Chest wall normal to inspection and palpation. Good excursion bilaterally. Lungs clear to auscultation. Good air movement bilaterally without rales, wheezes, or rhonchi.   Regular rate and  rhythm. S1 and S2 normal, no murmurs, clicks, gallops or rubs. trace edema of feet and no JVD.    Reviewed and updated as needed this visit by clinical staffTobacco  Allergies  Meds  Med Hx  Surg Hx  Fam Hx  Soc Hx      Reviewed and updated as needed this visit by Provider          ICD-10-CM    1. Hypertension goal BP (blood pressure) < 140/90 I10 amLODIPine (NORVASC) 10 MG tablet     bp control is good   She is not in heart failure that I can tell   I would continue all meds as currently prescribed     She told me she is a pre-diabetic but her Hemoglobin A1C is 6.7 the last time it was checked

## 2017-12-04 DIAGNOSIS — I10 HYPERTENSION GOAL BP (BLOOD PRESSURE) < 140/90: ICD-10-CM

## 2017-12-05 RX ORDER — LOSARTAN POTASSIUM AND HYDROCHLOROTHIAZIDE 12.5; 1 MG/1; MG/1
TABLET ORAL
Qty: 90 TABLET | Refills: 1 | Status: SHIPPED | OUTPATIENT
Start: 2017-12-05 | End: 2018-02-05

## 2017-12-05 NOTE — TELEPHONE ENCOUNTER
Prescription approved per [facility] Refill Protocol.    Loly Emerson RN  [facility]

## 2017-12-05 NOTE — TELEPHONE ENCOUNTER
Requested Prescriptions   Pending Prescriptions Disp Refills     losartan-hydrochlorothiazide (HYZAAR) 100-12.5 MG per tablet [Pharmacy Med Name: LOSARTAN/HCTZ 100-12.5MG TAB] 90 tablet 1     Sig: TAKE ONE TABLET BY MOUTH EVERY DAY    Angiotensin-II Receptors Passed    12/4/2017 10:00 AM       Passed - Blood pressure under 140/90 in past 12 months.    BP Readings from Last 3 Encounters:   08/03/17 130/60   03/28/17 139/89   06/20/16 128/77                Passed - Recent or future visit with authorizing provider's specialty    Patient had office visit in the last year or has a visit in the next 30 days with authorizing provider.  See chart review.              Passed - Patient is age 18 or older       Passed - No active pregnancy on record       Passed - Normal serum creatinine on file in past 12 months    Recent Labs   Lab Test  03/28/17   0932  07/29/11   CR  0.60   < >   --    CRPOC   --    --   0.7    < > = values in this interval not displayed.            Passed - Normal serum potassium on file in past 12 months    Recent Labs   Lab Test  03/28/17   0932   POTASSIUM  4.3                   Passed - No positive pregnancy test in past 12 months

## 2018-01-08 DIAGNOSIS — M51.360 LUMBAR DISCOGENIC PAIN SYNDROME: ICD-10-CM

## 2018-01-08 DIAGNOSIS — I10 HYPERTENSION GOAL BP (BLOOD PRESSURE) < 140/90: ICD-10-CM

## 2018-01-08 DIAGNOSIS — E78.5 HYPERLIPIDEMIA LDL GOAL <100: ICD-10-CM

## 2018-01-08 NOTE — TELEPHONE ENCOUNTER
"Requested Prescriptions   Pending Prescriptions Disp Refills     gabapentin (NEURONTIN) 100 MG capsule [Pharmacy Med Name: GABAPENTIN 100MG CAPS] 90 capsule 3          Last Written Prescription Date:  1-13-17  Last Fill Quantity: 90,   # refills: 3  Last Office Visit: 8-3-17  Future Office visit:       Routing refill request to provider for review/approval because:  Drug not on the [facility] [place], [facility] or [facility] refill protocol or controlled substance     Sig: TAKE ONE CAPSULE BY MOUTH AT BEDTIME    There is no refill protocol information for this order        pravastatin (PRAVACHOL) 40 MG tablet [Pharmacy Med Name: PRAVASTATIN SODIUM 40MG TABS] 90 tablet 3    Last Written Prescription Date:  1-13-17  Last Fill Quantity: 90,  # refills: 3   Last Office Visit with [facility] or [facility] prescribing provider:  8-3-17   Future Office Visit:      Sig: TAKE ONE TABLET BY MOUTH EVERY DAY    Statins Protocol Passed    1/8/2018  9:22 AM       Passed - LDL on file in past 12 months    Recent Labs   Lab Test  03/28/17   0932   LDL  92            Passed - No abnormal creatine kinase in past 12 months    No lab results found.         Passed - Recent or future visit with authorizing provider    Patient had office visit in the last year or has a visit in the next 30 days with authorizing provider.  See \"Patient Info\" tab in inbasket, or \"Choose Columns\" in Meds & Orders section of the refill encounter.              Passed - Patient is age 18 or older       Passed - No active pregnancy on record       Passed - No positive pregnancy test in past 12 months        metoprolol (TOPROL-XL) 200 MG 24 hr tablet [Pharmacy Med Name: METOPROLOL SUCC 200MG ER TAB] 90 tablet 3    Last Written Prescription Date:  1-13-17  Last Fill Quantity: 90,  # refills: 3   Last Office Visit with [facility] or [facility] prescribing provider:  8-3-17   Future Office Visit:      Sig: TAKE ONE TABLET BY MOUTH EVERY DAY    Beta-Blockers Protocol Passed    1/8/2018  9:22 AM    " "   Passed - Blood pressure under 140/90    BP Readings from Last 3 Encounters:   08/03/17 130/60   03/28/17 139/89   06/20/16 128/77                Passed - Patient is age 6 or older       Passed - Recent or future visit with authorizing provider's specialty    Patient had office visit in the last year or has a visit in the next 30 days with authorizing provider.  See \"Patient Info\" tab in inbasket, or \"Choose Columns\" in Meds & Orders section of the refill encounter.                 "

## 2018-01-09 RX ORDER — GABAPENTIN 100 MG/1
CAPSULE ORAL
Qty: 90 CAPSULE | Refills: 3 | Status: SHIPPED | OUTPATIENT
Start: 2018-01-09 | End: 2018-03-06

## 2018-01-09 RX ORDER — METOPROLOL SUCCINATE 200 MG/1
TABLET, EXTENDED RELEASE ORAL
Qty: 90 TABLET | Refills: 1 | Status: SHIPPED | OUTPATIENT
Start: 2018-01-09 | End: 2018-03-06

## 2018-01-09 RX ORDER — PRAVASTATIN SODIUM 40 MG
TABLET ORAL
Qty: 90 TABLET | Refills: 0 | Status: SHIPPED | OUTPATIENT
Start: 2018-01-09 | End: 2018-02-05

## 2018-01-09 NOTE — TELEPHONE ENCOUNTER
No follow up plan noted at 8/3/17 visit.    Metoprolol and pravastatin: Prescription approved per [facility] Refill Protocol.      Gabapentin: Routing refill request to provider for review/approval because:  Drug not on the [facility] refill protocol     Tavia Garcia RN  [facility]

## 2018-02-05 ENCOUNTER — OFFICE VISIT (OUTPATIENT)
Dept: FAMILY MEDICINE | Facility: CLINIC | Age: 83
End: 2018-02-05
Payer: COMMERCIAL

## 2018-02-05 VITALS
HEART RATE: 89 BPM | WEIGHT: 151 LBS | TEMPERATURE: 97.2 F | DIASTOLIC BLOOD PRESSURE: 77 MMHG | OXYGEN SATURATION: 94 % | BODY MASS INDEX: 33.97 KG/M2 | SYSTOLIC BLOOD PRESSURE: 142 MMHG | HEIGHT: 56 IN

## 2018-02-05 DIAGNOSIS — M54.42 CHRONIC BILATERAL LOW BACK PAIN WITH LEFT-SIDED SCIATICA: ICD-10-CM

## 2018-02-05 DIAGNOSIS — E78.5 HYPERLIPIDEMIA LDL GOAL <100: ICD-10-CM

## 2018-02-05 DIAGNOSIS — G89.29 CHRONIC BILATERAL LOW BACK PAIN WITH LEFT-SIDED SCIATICA: ICD-10-CM

## 2018-02-05 DIAGNOSIS — I10 HYPERTENSION GOAL BP (BLOOD PRESSURE) < 140/90: ICD-10-CM

## 2018-02-05 DIAGNOSIS — E11.9 TYPE 2 DIABETES MELLITUS WITHOUT COMPLICATION, WITHOUT LONG-TERM CURRENT USE OF INSULIN (H): Primary | ICD-10-CM

## 2018-02-05 LAB
ANION GAP SERPL CALCULATED.3IONS-SCNC: 10 MMOL/L (ref 3–14)
BUN SERPL-MCNC: 21 MG/DL (ref 7–30)
CALCIUM SERPL-MCNC: 9.7 MG/DL (ref 8.5–10.1)
CHLORIDE SERPL-SCNC: 104 MMOL/L (ref 94–109)
CO2 SERPL-SCNC: 26 MMOL/L (ref 20–32)
CREAT SERPL-MCNC: 0.56 MG/DL (ref 0.52–1.04)
GFR SERPL CREATININE-BSD FRML MDRD: >90 ML/MIN/1.7M2
GLUCOSE SERPL-MCNC: 127 MG/DL (ref 70–99)
HBA1C MFR BLD: 6.4 % (ref 4.3–6)
POTASSIUM SERPL-SCNC: 4 MMOL/L (ref 3.4–5.3)
SODIUM SERPL-SCNC: 140 MMOL/L (ref 133–144)

## 2018-02-05 PROCEDURE — 83036 HEMOGLOBIN GLYCOSYLATED A1C: CPT | Performed by: FAMILY MEDICINE

## 2018-02-05 PROCEDURE — 80048 BASIC METABOLIC PNL TOTAL CA: CPT | Performed by: FAMILY MEDICINE

## 2018-02-05 PROCEDURE — 99214 OFFICE O/P EST MOD 30 MIN: CPT | Performed by: FAMILY MEDICINE

## 2018-02-05 PROCEDURE — 36415 COLL VENOUS BLD VENIPUNCTURE: CPT | Performed by: FAMILY MEDICINE

## 2018-02-05 RX ORDER — LOSARTAN POTASSIUM AND HYDROCHLOROTHIAZIDE 25; 100 MG/1; MG/1
1 TABLET ORAL DAILY
Qty: 90 TABLET | Refills: 1 | Status: SHIPPED | OUTPATIENT
Start: 2018-02-05 | End: 2018-03-06

## 2018-02-05 RX ORDER — PRAVASTATIN SODIUM 40 MG
40 TABLET ORAL DAILY
Qty: 90 TABLET | Refills: 3 | Status: SHIPPED | OUTPATIENT
Start: 2018-02-05 | End: 2018-03-06

## 2018-02-05 RX ORDER — METFORMIN HYDROCHLORIDE 750 MG/1
1500 TABLET, EXTENDED RELEASE ORAL
Qty: 180 TABLET | Refills: 3 | Status: SHIPPED | OUTPATIENT
Start: 2018-02-05 | End: 2018-03-06

## 2018-02-05 RX ORDER — CYCLOBENZAPRINE HCL 10 MG
10 TABLET ORAL DAILY
Qty: 90 TABLET | Refills: 1 | Status: SHIPPED | OUTPATIENT
Start: 2018-02-05 | End: 2018-02-06

## 2018-02-05 NOTE — PROGRESS NOTES
SUBJECTIVE:   David Ng is a 83 year old female who presents to clinic today for the following health issues:      Diabetes Follow-up      Patient is checking blood sugars: not at all    Diabetic concerns: other - Unsure what her blood sugar is supposed to be     Symptoms of hypoglycemia (low blood sugar): none     Paresthesias (numbness or burning in feet) or sores: No     Date of last diabetic eye exam: 2017    BP Readings from Last 2 Encounters:   02/05/18 142/77   08/03/17 130/60     Hemoglobin A1C (%)   Date Value   03/28/2017 6.7 (H)   06/20/2016 6.7 (H)     LDL Cholesterol Calculated (mg/dL)   Date Value   03/28/2017 92   11/02/2015 73       Amount of exercise or physical activity: None    Problems taking medications regularly: No    Medication side effects: none    Diet: regular (no restrictions)    Patient was seen by eye within the last year   She wants a handicap sticker for her car because of pain in her knees and back   She does not have a meter   She does no checks     She wants a refill of her cyclobenzaprine     Ros: no chest pain, chest tightness   No sob   No leg edema   No abdominal pain     Denies fever or chills   Weight stable     Denies neuropathy   denies claudication      ROS: 10 point ROS neg other than the symptoms noted above in the HPI.      Past Medical History:   Diagnosis Date     Abnormal mammogram 3-96     Advanced directives, counseling/discussion 9/2/2011     Cough 7/25/2011     CTS (carpal tunnel syndrome) 3/13/2012     DJD (degenerative joint disease)      DM (diabetes mellitus), type 2, uncontrolled (H) 4/15/2010     HTN (hypertension) 4/15/2010     Hypercholesteremia 4/15/2010     Hyperlipidemia LDL goal <100 10/31/2010     Hypertension goal BP (blood pressure) < 140/90 7/24/2011     LBP (low back pain) 9/9/2011     Osteoporosis, post-menopausal 7/26/2011     Thoracic back pain 11/12/2012     Type 2 diabetes, HbA1C goal < 8% (H) 7/24/2011       Past Surgical History:    Procedure Laterality Date     D & C       HC FEMUR/KNEE SURG UNLISTED  8-    knee scope bilaterally     HEMORRHOIDECTOMY       ROTATOR CUFF REPAIR RT/LT      LT rotator cuff tear / Biceps tendod tear / glenoid labrum tear / Acromioclavicular arthritis     VERTEBROPLASTY  2012    T12       Family History   Problem Relation Age of Onset     CEREBROVASCULAR DISEASE Mother 70     GASTROINTESTINAL DISEASE Father      liver problems     HEART DISEASE Brother 58      at 58, [place]     Glaucoma No family hx of      Macular Degeneration No family hx of        Social History   Substance Use Topics     Smoking status: Never Smoker     Smokeless tobacco: Never Used      Comment: No second hand exposure.     Alcohol use No         Current Outpatient Prescriptions   Medication Sig Dispense Refill     cyclobenzaprine (FLEXERIL) 10 MG tablet Take 1 tablet (10 mg) by mouth daily 90 tablet 1     gabapentin (NEURONTIN) 100 MG capsule TAKE ONE CAPSULE BY MOUTH AT BEDTIME 90 capsule 3     pravastatin (PRAVACHOL) 40 MG tablet TAKE ONE TABLET BY MOUTH EVERY DAY 90 tablet 0     metoprolol (TOPROL-XL) 200 MG 24 hr tablet TAKE ONE TABLET BY MOUTH EVERY DAY 90 tablet 1     losartan-hydrochlorothiazide (HYZAAR) 100-12.5 MG per tablet TAKE ONE TABLET BY MOUTH EVERY DAY 90 tablet 1     metFORMIN (GLUCOPHAGE-XR) 750 MG 24 hr tablet TAKE 2 TABLETS BY MOUTH DAILY  WITH DINNER 180 tablet 1     amLODIPine (NORVASC) 10 MG tablet Take 1 tablet (10 mg) by mouth daily 90 tablet 3     Naproxen Sodium (ALEVE PO)        acetaminophen (TYLENOL) 500 MG tablet Take 2 tablets by mouth 2 times daily. 100 tablet 11     Calcium Carbonate-Vitamin D (CALCIUM + D) 600-200 MG-UNIT TABS Take 2 tablets by mouth daily. 200 tablet 4     cholecalciferol (VITAMIN D) 400 UNIT TABS Take 1 tablet by mouth daily. 100 tablet 4     aspirin 325 MG EC tablet Take 1-2 tablets by mouth daily as needed.       GLUCOSAMINE 500 MG OR TABS 1 TABLET DAILY       O: BP  "142/77 (BP Location: Left arm, Patient Position: Chair, Cuff Size: Adult Regular)  Pulse 89  Temp 97.2  F (36.2  C) (Oral)  Ht 4' 7.51\" (1.41 m)  Wt 151 lb (68.5 kg)  SpO2 94%  BMI 34.45 kg/m2      Chest wall normal to inspection and palpation. Good excursion bilaterally. Lungs clear to auscultation. Good air movement bilaterally without rales, wheezes, or rhonchi.   Regular rate and  rhythm. S1 and S2 normal, no murmurs, clicks, gallops or rubs. No edema or JVD.    Whenever she checks her bp it is about the same time     She has localized pain over L1   She complains of low back pain and bilateral knee pain   She has trouble getting out of the chair     Patient would also like to discuss about getting a handicap sign.        ICD-10-CM    1. Type 2 diabetes mellitus without complication, without long-term current use of insulin (H) E11.9    2. Chronic bilateral low back pain with left-sided sciatica M54.42 cyclobenzaprine (FLEXERIL) 10 MG tablet    G89.29    3. Hypertension goal BP (blood pressure) < 140/90 I10 losartan-hydrochlorothiazide (HYZAAR) 100-25 MG per tablet   4. Hyperlipidemia LDL goal <100 E78.5      I have changed her dose of lis/hctz to compensate for bp going up slightly   Since Hemoglobin A1C well controlled I do not feel the patient needs to be checking blood sugars on her own   "

## 2018-02-05 NOTE — LETTER
[facility]   [address] [place]  [address]  [phone]                                   February 6, 2018    Dvaid Ng  [address] [facility] [place] [address]        Dear David,    Your Hemoglobin A1C indicates good control of your diabetes   Your basic metabolic panel which includes electrolytes,kidney function is normal and  -Glucose (diabetic screening test) is elevated mildly.     Follow up in 6 month(s)    Results for orders placed or performed in visit on 02/05/18   Basic metabolic panel   Result Value Ref Range    Sodium 140 133 - 144 mmol/L    Potassium 4.0 3.4 - 5.3 mmol/L    Chloride 104 94 - 109 mmol/L    Carbon Dioxide 26 20 - 32 mmol/L    Anion Gap 10 3 - 14 mmol/L    Glucose 127 (H) 70 - 99 mg/dL    Urea Nitrogen 21 7 - 30 mg/dL    Creatinine 0.56 0.52 - 1.04 mg/dL    GFR Estimate >90 >60 mL/min/1.7m2    GFR Estimate If Black >90 >60 mL/min/1.7m2    Calcium 9.7 8.5 - 10.1 mg/dL   Hemoglobin A1c   Result Value Ref Range    Hemoglobin A1C 6.4 (H) 4.3 - 6.0 %       If you have any questions please call the clinic at [phone]    Sincerely,    Rey Becker MD  bmd

## 2018-02-05 NOTE — MR AVS SNAPSHOT
"              After Visit Summary   2/5/2018    David Ng    MRN: [id number]           Patient Information     Date Of Birth          8/24/1934        Visit Information        Provider Department      2/5/2018 9:00 AM Rey Becker MD [facility]        Today's Diagnoses     Type 2 diabetes mellitus without complication, without long-term current use of insulin (H)    -  1    Chronic bilateral low back pain with left-sided sciatica        Hypertension goal BP (blood pressure) < 140/90        Hyperlipidemia LDL goal <100           Follow-ups after your visit        Who to contact     If you have questions or need follow up information about today's clinic visit or your schedule please contact [facility] directly at [phone].  Normal or non-critical lab and imaging results will be communicated to you by MyChart, letter or phone within 4 business days after the clinic has received the results. If you do not hear from us within 7 days, please contact the clinic through MyChart or phone. If you have a critical or abnormal lab result, we will notify you by phone as soon as possible.  Submit refill requests through [employer] or call your pharmacy and they will forward the refill request to us. Please allow 3 business days for your refill to be completed.          Additional Information About Your Visit        MyChart Information     [employer] lets you send messages to your doctor, view your test results, renew your prescriptions, schedule appointments and more. To sign up, go to [email or web address] . Click on \"Log in\" on the left side of the screen, which will take you to the Welcome page. Then click on \"Sign up Now\" on the right side of the page.     You will be asked to enter the access code listed below, as well as some personal information. Please follow the directions to create your username and password.     Your access code is: [email or web address]  Expires: " "2018  9:45 AM     Your access code will  in 90 days. If you need help or a new code, please call your [facility] clinic or [phone].        Care EveryWhere ID     This is your Care EveryWhere ID. This could be used by other organizations to access your [facility] medical records  [id number]        Your Vitals Were     Pulse Temperature Height Pulse Oximetry BMI (Body Mass Index)       89 97.2  F (36.2  C) (Oral) 4' 7.51\" (1.41 m) 94% 34.45 kg/m2        Blood Pressure from Last 3 Encounters:   18 142/77   17 130/60   17 139/89    Weight from Last 3 Encounters:   18 151 lb (68.5 kg)   17 148 lb (67.1 kg)   17 150 lb (68 kg)              We Performed the Following     Basic metabolic panel     Hemoglobin A1c          Today's Medication Changes          These changes are accurate as of 18  9:46 AM.  If you have any questions, ask your nurse or doctor.               Start taking these medicines.        Dose/Directions    cyclobenzaprine 10 MG tablet   Commonly known as:  FLEXERIL   Used for:  Chronic bilateral low back pain with left-sided sciatica   Started by:  Rey Becker MD        Dose:  10 mg   Take 1 tablet (10 mg) by mouth daily   Quantity:  90 tablet   Refills:  1       losartan-hydrochlorothiazide 100-25 MG per tablet   Commonly known as:  HYZAAR   Used for:  Hypertension goal BP (blood pressure) < 140/90   Replaces:  losartan-hydrochlorothiazide 100-12.5 MG per tablet   Started by:  Rey Becker MD        Dose:  1 tablet   Take 1 tablet by mouth daily   Quantity:  90 tablet   Refills:  1         These medicines have changed or have updated prescriptions.        Dose/Directions    metFORMIN 750 MG 24 hr tablet   Commonly known as:  GLUCOPHAGE-XR   This may have changed:  See the new instructions.   Used for:  Type 2 diabetes mellitus without complication, without long-term current use of insulin (H)   Changed by:  Rey Becker MD     "    Dose:  1500 mg   Take 2 tablets (1,500 mg) by mouth daily (with dinner)   Quantity:  180 tablet   Refills:  3       pravastatin 40 MG tablet   Commonly known as:  PRAVACHOL   This may have changed:  See the new instructions.   Used for:  Hyperlipidemia LDL goal <100   Changed by:  Rey Becker MD        Dose:  40 mg   Take 1 tablet (40 mg) by mouth daily   Quantity:  90 tablet   Refills:  3         Stop taking these medicines if you haven't already. Please contact your care team if you have questions.     losartan-hydrochlorothiazide 100-12.5 MG per tablet   Commonly known as:  HYZAAR   Replaced by:  losartan-hydrochlorothiazide 100-25 MG per tablet   Stopped by:  Rey Becker MD                Where to get your medicines      These medications were sent to [facility] [address]  [address] [facility] [address]     Phone:  [phone]     cyclobenzaprine 10 MG tablet    metFORMIN 750 MG 24 hr tablet    pravastatin 40 MG tablet         Some of these will need a paper prescription and others can be bought over the counter.  Ask your nurse if you have questions.     Bring a paper prescription for each of these medications     losartan-hydrochlorothiazide 100-25 MG per tablet                Primary Care Provider Office Phone # Fax #    Jayden Munoz [phone]       [address] [facility] [address]        Equal Access to Services     [facility] AH: Hadii randal moreno hadasho Soomaali, waaxda luqadaha, qaybta kaalmada adeegyada, melissa mead . So [facility] [phone].    ATENCIÓN: Si habla español, tiene a moe disposición servicios gratuitos de asistencia lingüística. Keturah al [phone].    We comply with applicable federal civil rights laws and [place] laws. We do not discriminate on the basis of race, color, national origin, age, disability, sex, sexual orientation,  or gender identity.            Thank you!     Thank you for choosing [facility]  for your care. Our goal is always to provide you with excellent care. Hearing back from our patients is one way we can continue to improve our services. Please take a few minutes to complete the written survey that you may receive in the mail after your visit with us. Thank you!             Your Updated Medication List - Protect others around you: Learn how to safely use, store and throw away your medicines at www.disposemymeds.org.          This list is accurate as of 2/5/18  9:46 AM.  Always use your most recent med list.                   Brand Name Dispense Instructions for use Diagnosis    acetaminophen 500 MG tablet    TYLENOL    100 tablet    Take 2 tablets by mouth 2 times daily.    DJD (degenerative joint disease), LBP (low back pain)       ALEVE PO           amLODIPine 10 MG tablet    NORVASC    90 tablet    Take 1 tablet (10 mg) by mouth daily    Hypertension goal BP (blood pressure) < 140/90       aspirin 325 MG EC tablet      Take 1-2 tablets by mouth daily as needed.        calcium carbonate-vitamin D 600-200 MG-UNIT Tabs    calcium + D    200 tablet    Take 2 tablets by mouth daily.    Osteoporosis, post-menopausal       cholecalciferol 400 UNIT Tabs tablet    vitamin D3    100 tablet    Take 1 tablet by mouth daily.    Osteoporosis, post-menopausal       cyclobenzaprine 10 MG tablet    FLEXERIL    90 tablet    Take 1 tablet (10 mg) by mouth daily    Chronic bilateral low back pain with left-sided sciatica       gabapentin 100 MG capsule    NEURONTIN    90 capsule    TAKE ONE CAPSULE BY MOUTH AT BEDTIME    Lumbar discogenic pain syndrome       glucosamine 500 MG Tabs      1 TABLET DAILY        losartan-hydrochlorothiazide 100-25 MG per tablet    HYZAAR    90 tablet    Take 1 tablet by mouth daily    Hypertension goal BP (blood pressure) < 140/90       metFORMIN 750 MG 24 hr tablet    GLUCOPHAGE-XR     180 tablet    Take 2 tablets (1,500 mg) by mouth daily (with dinner)    Type 2 diabetes mellitus without complication, without long-term current use of insulin (H)       metoprolol succinate 200 MG 24 hr tablet    TOPROL-XL    90 tablet    TAKE ONE TABLET BY MOUTH EVERY DAY    Hypertension goal BP (blood pressure) < 140/90       pravastatin 40 MG tablet    PRAVACHOL    90 tablet    Take 1 tablet (40 mg) by mouth daily    Hyperlipidemia LDL goal <100

## 2018-02-05 NOTE — NURSING NOTE
"Chief Complaint   Patient presents with     Diabetes     Health Maintenance     A1C. diabetic ed, fall risk       Initial /77 (BP Location: Left arm, Patient Position: Chair, Cuff Size: Adult Regular)  Pulse 89  Temp 97.2  F (36.2  C) (Oral)  Ht 4' 7.51\" (1.41 m)  Wt 151 lb (68.5 kg)  SpO2 94%  BMI 34.45 kg/m2 Estimated body mass index is 34.45 kg/(m^2) as calculated from the following:    Height as of this encounter: 4' 7.51\" (1.41 m).    Weight as of this encounter: 151 lb (68.5 kg).  Medication Reconciliation: complete   Vilma See JOSE EDUARDO Musa      "

## 2018-02-06 ENCOUNTER — TELEPHONE (OUTPATIENT)
Dept: FAMILY MEDICINE | Facility: CLINIC | Age: 83
End: 2018-02-06

## 2018-02-06 DIAGNOSIS — M54.42 CHRONIC BILATERAL LOW BACK PAIN WITH LEFT-SIDED SCIATICA: ICD-10-CM

## 2018-02-06 DIAGNOSIS — G89.29 CHRONIC BILATERAL LOW BACK PAIN WITH LEFT-SIDED SCIATICA: ICD-10-CM

## 2018-02-06 NOTE — TELEPHONE ENCOUNTER
Routed to provider.    Please see message below.  He is also looking for a transition refill or a change in med also.      Marilee Mckeon RN-BSN, [facility] Triage.

## 2018-02-06 NOTE — PROGRESS NOTES
Your Hemoglobin A1C indicates good control of your diabetes   Your basic metabolic panel which includes electrolytes,kidney function is normal and  -Glucose (diabetic screening test) is elevated mildly.     Follow up in 6 month(s)

## 2018-02-06 NOTE — TELEPHONE ENCOUNTER
Prior Authorization Retail Medication Request  Medication/Dose: Prior Auth-Cyclobenzaprine 10mg  Diagnosis and ICD code: ,   New/Renewal/Insurance Change PA: new  Previously Tried and Failed Therapies: ?    Insurance ID (if provided): [id number]  Insurance Phone (if provided): [phone]    Any additional info from fax request: They covered a 30 day transition fill to get her through until you get a prior auth. OR tizanidine is usually a covered product if you want to change it.     Thanks!  Ashley An, [facility]  [phone]

## 2018-03-05 ENCOUNTER — TELEPHONE (OUTPATIENT)
Dept: FAMILY MEDICINE | Facility: CLINIC | Age: 83
End: 2018-03-05

## 2018-03-05 DIAGNOSIS — M51.360 LUMBAR DISCOGENIC PAIN SYNDROME: ICD-10-CM

## 2018-03-05 DIAGNOSIS — G89.29 CHRONIC BILATERAL LOW BACK PAIN WITH LEFT-SIDED SCIATICA: ICD-10-CM

## 2018-03-05 DIAGNOSIS — E11.9 TYPE 2 DIABETES MELLITUS WITHOUT COMPLICATION, WITHOUT LONG-TERM CURRENT USE OF INSULIN (H): ICD-10-CM

## 2018-03-05 DIAGNOSIS — E78.5 HYPERLIPIDEMIA LDL GOAL <100: ICD-10-CM

## 2018-03-05 DIAGNOSIS — M54.42 CHRONIC BILATERAL LOW BACK PAIN WITH LEFT-SIDED SCIATICA: ICD-10-CM

## 2018-03-05 DIAGNOSIS — I10 HYPERTENSION GOAL BP (BLOOD PRESSURE) < 140/90: ICD-10-CM

## 2018-03-05 NOTE — TELEPHONE ENCOUNTER
Reason for Call:  Medication or medication refill:    Do you use a [facility]?  Name of the pharmacy and phone number for the current request:  [facility]    Name of the medication requested: Losartan 100/25 mg, gabapentin 100 mg, cyclobenzaprine 10 mg, amlodipine 10 mg, metoprolol 200 mg, metformin 750 mg, Pravastatin 40 mg, acue check self click, acue check solution, BB swabs, Acue check test strips    Other request:     Can we leave a detailed message on this number? YES    Phone number patient can be reached at: Other phone number:  [phone], fax [phone]    Best Time: anytime    Call taken on 3/5/2018 at 1:56 PM by Mckayla Edmonds

## 2018-03-06 RX ORDER — PRAVASTATIN SODIUM 40 MG
40 TABLET ORAL DAILY
Qty: 90 TABLET | Refills: 3 | Status: SHIPPED | OUTPATIENT
Start: 2018-03-06 | End: 2018-12-11

## 2018-03-06 RX ORDER — GABAPENTIN 100 MG/1
CAPSULE ORAL
Qty: 90 CAPSULE | Refills: 3 | Status: SHIPPED | OUTPATIENT
Start: 2018-03-06 | End: 2019-03-20

## 2018-03-06 RX ORDER — METFORMIN HYDROCHLORIDE 750 MG/1
1500 TABLET, EXTENDED RELEASE ORAL
Qty: 180 TABLET | Refills: 1 | Status: SHIPPED | OUTPATIENT
Start: 2018-03-06 | End: 2018-07-23

## 2018-03-06 RX ORDER — LOSARTAN POTASSIUM AND HYDROCHLOROTHIAZIDE 25; 100 MG/1; MG/1
1 TABLET ORAL DAILY
Qty: 90 TABLET | Refills: 1 | Status: SHIPPED | OUTPATIENT
Start: 2018-03-06 | End: 2018-07-23

## 2018-03-06 RX ORDER — METOPROLOL SUCCINATE 200 MG/1
200 TABLET, EXTENDED RELEASE ORAL DAILY
Qty: 90 TABLET | Refills: 1 | Status: SHIPPED | OUTPATIENT
Start: 2018-03-06 | End: 2018-07-23

## 2018-03-06 RX ORDER — AMLODIPINE BESYLATE 10 MG/1
10 TABLET ORAL DAILY
Qty: 90 TABLET | Refills: 3 | Status: SHIPPED | OUTPATIENT
Start: 2018-03-06 | End: 2018-07-23

## 2018-03-06 NOTE — TELEPHONE ENCOUNTER
Sent in refills on meds.  I agree with Dr DOMÍNGUEZ ( who saw patient last ) that she does not need to check sugars at home, so no need for strips/ lancets/ etc.    See us this fall in clinic    Please inform patient    Jayden Munoz MD

## 2018-03-06 NOTE — TELEPHONE ENCOUNTER
Called pharmacy and notified regarding not checking sugars at home - spoke to David - they will stop sending faxes for DM supplies.    Attempt # 1  Called patient at home number.[phone]  Was call answered?  Yes, spoke to patient - relayed below message from provider.  Patient verbalized understanding and agreement with plan    Brittany Moody RN  [facility]

## 2018-03-08 ENCOUNTER — TELEPHONE (OUTPATIENT)
Dept: FAMILY MEDICINE | Facility: CLINIC | Age: 83
End: 2018-03-08

## 2018-03-08 DIAGNOSIS — M51.360 LUMBAR DISCOGENIC PAIN SYNDROME: ICD-10-CM

## 2018-03-08 DIAGNOSIS — G89.29 CHRONIC BILATERAL LOW BACK PAIN WITH LEFT-SIDED SCIATICA: ICD-10-CM

## 2018-03-08 DIAGNOSIS — M54.42 CHRONIC BILATERAL LOW BACK PAIN WITH LEFT-SIDED SCIATICA: ICD-10-CM

## 2018-03-08 RX ORDER — CYCLOBENZAPRINE HCL 10 MG
10 TABLET ORAL DAILY
Qty: 90 TABLET | Refills: 1 | Status: CANCELLED | OUTPATIENT
Start: 2018-03-08

## 2018-03-08 NOTE — TELEPHONE ENCOUNTER
Called [facility] - spoke to [facility] pharm tech - notified the Flexeril with discontinued and Tizanidine ordered in its place, patient does not need to check BS at home (patient notified 3/6/2018) . [facility] will take all of these out of their system.    Brittany Moody RN  [facility]

## 2018-03-08 NOTE — TELEPHONE ENCOUNTER
Reason for Call:  Medication or medication refill:    Do you use a [facility]?  Name of the pharmacy and phone number for the current request:  [facility], pended    Name of the medication requested: cyclobenziprine, Accu-chek Radha meter with all testing supplies.    Other request: neither med is on patients active med list.  Pharmacy calling to get refills.  These are new to them.    Can we leave a detailed message on this number? NO    Phone number patient can be reached at: Other phone number:  [phone]    Best Time: any    Call taken on 3/8/2018 at 9:41 AM by Kalani Espinosa

## 2018-04-25 ENCOUNTER — TELEPHONE (OUTPATIENT)
Dept: FAMILY MEDICINE | Facility: CLINIC | Age: 83
End: 2018-04-25

## 2018-04-25 DIAGNOSIS — G89.29 CHRONIC BILATERAL LOW BACK PAIN WITH LEFT-SIDED SCIATICA: ICD-10-CM

## 2018-04-25 DIAGNOSIS — M54.42 CHRONIC BILATERAL LOW BACK PAIN WITH LEFT-SIDED SCIATICA: ICD-10-CM

## 2018-05-01 ENCOUNTER — OFFICE VISIT (OUTPATIENT)
Dept: FAMILY MEDICINE | Facility: CLINIC | Age: 83
End: 2018-05-01
Payer: COMMERCIAL

## 2018-05-01 VITALS
TEMPERATURE: 97.6 F | HEART RATE: 86 BPM | BODY MASS INDEX: 34.68 KG/M2 | WEIGHT: 152 LBS | SYSTOLIC BLOOD PRESSURE: 139 MMHG | DIASTOLIC BLOOD PRESSURE: 81 MMHG

## 2018-05-01 DIAGNOSIS — E66.9 OBESITY, UNSPECIFIED CLASSIFICATION, UNSPECIFIED OBESITY TYPE, UNSPECIFIED WHETHER SERIOUS COMORBIDITY PRESENT: ICD-10-CM

## 2018-05-01 DIAGNOSIS — E11.9 TYPE 2 DIABETES MELLITUS WITHOUT COMPLICATION, WITHOUT LONG-TERM CURRENT USE OF INSULIN (H): ICD-10-CM

## 2018-05-01 DIAGNOSIS — E78.5 HYPERLIPIDEMIA LDL GOAL <100: Primary | ICD-10-CM

## 2018-05-01 DIAGNOSIS — I10 HYPERTENSION GOAL BP (BLOOD PRESSURE) < 140/90: ICD-10-CM

## 2018-05-01 DIAGNOSIS — G47.00 INSOMNIA, UNSPECIFIED TYPE: ICD-10-CM

## 2018-05-01 LAB
CHOLEST SERPL-MCNC: 206 MG/DL
CREAT UR-MCNC: 71 MG/DL
HDLC SERPL-MCNC: 51 MG/DL
LDLC SERPL CALC-MCNC: 121 MG/DL
MICROALBUMIN UR-MCNC: 10 MG/L
MICROALBUMIN/CREAT UR: 13.92 MG/G CR (ref 0–25)
NONHDLC SERPL-MCNC: 155 MG/DL
TRIGL SERPL-MCNC: 172 MG/DL

## 2018-05-01 PROCEDURE — 82043 UR ALBUMIN QUANTITATIVE: CPT | Performed by: FAMILY MEDICINE

## 2018-05-01 PROCEDURE — 80061 LIPID PANEL: CPT | Performed by: FAMILY MEDICINE

## 2018-05-01 PROCEDURE — 99214 OFFICE O/P EST MOD 30 MIN: CPT | Performed by: FAMILY MEDICINE

## 2018-05-01 PROCEDURE — 36415 COLL VENOUS BLD VENIPUNCTURE: CPT | Performed by: FAMILY MEDICINE

## 2018-05-01 PROCEDURE — 99207 C FOOT EXAM  NO CHARGE: CPT | Mod: 25 | Performed by: FAMILY MEDICINE

## 2018-05-01 RX ORDER — LANOLIN ALCOHOL/MO/W.PET/CERES
CREAM (GRAM) TOPICAL
Qty: 60 TABLET | Refills: 5 | Status: SHIPPED | OUTPATIENT
Start: 2018-05-01

## 2018-05-01 ASSESSMENT — PAIN SCALES - GENERAL: PAINLEVEL: NO PAIN (0)

## 2018-05-01 NOTE — PATIENT INSTRUCTIONS
Only new medication to try is melatonin 3mg.  Try one at bedtime.  If not working, could go up to 2 pills at bedtime.    Continue other medications as is    Increase walking as you are able    We will send you lab results

## 2018-05-01 NOTE — LETTER
[facility]  [address] [place]  [address]  [phone]        May 2, 2018    David Ng  [address] [facility] [place] [address]        Dear David,    The cholesterol is a little high.  Stay on same cholesterol medication and keep working on healthy diet/exercise as you are able.     The urine protein test is normal.     Results for orders placed or performed in visit on 05/01/18   Lipid panel reflex to direct LDL Fasting   Result Value Ref Range    Cholesterol 206 (H) <200 mg/dL    Triglycerides 172 (H) <150 mg/dL    HDL Cholesterol 51 >49 mg/dL    LDL Cholesterol Calculated 121 (H) <100 mg/dL    Non HDL Cholesterol 155 (H) <130 mg/dL   Albumin Random Urine Quantitative with Creat Ratio   Result Value Ref Range    Creatinine Urine 71 mg/dL    Albumin Urine mg/L 10 mg/L    Albumin Urine mg/g Cr 13.92 0 - 25 mg/g Cr       If you have any questions please call the clinic at [phone].    Sincerely,    Jayden BHAGATL

## 2018-05-01 NOTE — PROGRESS NOTES
SUBJECTIVE:   David Ng is a 83 year old female who presents to clinic today for the following health issues:       Diabetes Follow-up      Patient is checking blood sugars: not at all    Diabetic concerns: None     Symptoms of hypoglycemia (low blood sugar): none     Paresthesias (numbness or burning in feet) or sores: No     Date of last diabetic eye exam: 07/27/2017    BP Readings from Last 2 Encounters:   02/05/18 142/77   08/03/17 130/60     Hemoglobin A1C (%)   Date Value   02/05/2018 6.4 (H)   03/28/2017 6.7 (H)     LDL Cholesterol Calculated (mg/dL)   Date Value   03/28/2017 92   11/02/2015 73       Amount of exercise or physical activity: None    Problems taking medications regularly: No    Medication side effects: none    Diet: regular (no restrictions)        none    Problem list and histories reviewed & adjusted, as indicated.  Additional history: as documented         Reviewed and updated as needed this visit by clinical staff       Reviewed and updated as needed this visit by Provider          some walking, not much    Eating healthy diet    Sometimes not sleeping    Tylenol pm helps a bit but drowsy the next day      Physical Exam   Constitutional: She is oriented to person, place, and time and well-developed, well-nourished, and in no distress. No distress.   HENT:   Head: Normocephalic and atraumatic.   Neck: Carotid bruit is not present.   Cardiovascular: Normal rate, regular rhythm, normal heart sounds and intact distal pulses.  Exam reveals no gallop and no friction rub.    No murmur heard.  Pulmonary/Chest: Effort normal and breath sounds normal.   Musculoskeletal: She exhibits no edema.   Neurological: She is alert and oriented to person, place, and time.   Skin: She is not diaphoretic.   Psychiatric: Mood and affect normal.       ASSESSMENT / PLAN:  (E78.5) Hyperlipidemia LDL goal <100  (primary encounter diagnosis)  Comment: check labs fasting  Plan: Lipid panel reflex to direct LDL  Fasting        On pravastatin 40    (I10) Hypertension goal BP (blood pressure) < 140/90  Comment: here blood pressure barely to goal   Plan: stay on same group of blood pressure meds     (E11.9) Type 2 diabetes mellitus without complication, without long-term current use of insulin (H)  Comment: last hemoglobin a1c fine.    Plan: DIABETES EDUCATOR REFERRAL, FOOT EXAM, Albumin         Random Urine Quantitative with Creat Ratio             (G47.00) Insomnia, unspecified type  Comment: avoid muscle relaxer ( had side effect on this ).  Avoid tyl pm ( groggy in am)    Plan: melatonin 3 MG tablet        Use this instead.  Follow up prn     (E66.9) Obesity, unspecified classification, unspecified obesity type, unspecified whether serious comorbidity present  Comment: chronic   Plan: keep working on healthy diet/exercise and wt loss as able       I reviewed the patient's medications, allergies, medical history, family history, and social history.    Jayden Munoz MD  ]

## 2018-05-01 NOTE — MR AVS SNAPSHOT
After Visit Summary   5/1/2018    David Ng    MRN: [id number]           Patient Information     Date Of Birth          8/24/1934        Visit Information        Provider Department      5/1/2018 9:40 AM Jayden Munoz MD [facility]        Today's Diagnoses     Hyperlipidemia LDL goal <100    -  1    Hypertension goal BP (blood pressure) < 140/90        Type 2 diabetes mellitus without complication, without long-term current use of insulin (H)        Insomnia, unspecified type          Care Instructions    Only new medication to try is melatonin 3mg.  Try one at bedtime.  If not working, could go up to 2 pills at bedtime.    Continue other medications as is    Increase walking as you are able    We will send you lab results           Follow-ups after your visit        Additional Services     DIABETES EDUCATOR REFERRAL       DIABETES SELF MANAGEMENT TRAINING (DSMT)      Your provider has referred you to Diabetes Education: FMG: Diabetes Education - [facility] [phone]   [email or web address]     If an urgent visit is needed or A1C is above 12, Care Team to call the Diabetes  Education Team at [phone] or send an In Basket message to the Diabetes Education Pool (P DIAB ED-PATIENT CARE).    A  will call you to make your appointment. If it has been more than 3 business days since your referral was placed, please call the above phone number to schedule.    Type of training and number of hours: Previous Diagnosis: Follow-up DSMT - 2 hours.    Diabetes Type: Type 2 - On Oral Medication   Medicare covers: 10 hours of initial DSMT in 12 month period from the time of first visit, plus 2 hours of follow-up DSMT annually, and additional hours as requested for insulin training.         Diabetes Co-Morbidities: none               A1C Goal:  <8.0       A1C is: Lab Results       Component                Value                Date                       A1C                      6.4                 02/05/2018              MEDICAL NUTRITION THERAPY (MNT) for Diabetes    Medical Nutrition Therapy with a Registered Dietitian can be provided in coordination with Diabetes Self-Management Training to assist in achieving optimal diabetes management.    MNT Type and Hours: Previous diagnosis: Annual follow-up MNT - 2 hours                       Medicare will cover: 3 hours initial MNT in 12 month period after first visit, plus 2 hours of follow-up MNT annually        Diabetes Education Topics: Comprehensive Knowledge Assessment and Instruction    Special Educational Needs Requiring Individual DSMT: None      Please be aware that coverage of these services is subject to the terms and limitations of your health insurance plan.  Call member services at your health plan to determine Diabetes Self-Management Training (Codes  and ) and Medical Nutrition Therapy (Codes 35824 and 80062) benefits and ask which blood glucose monitor brands are covered by your plan.  Please bring the following with you to your appointment:    (1)  List of current medications   (2)  List of Blood Glucose Monitor brands that are covered by your insurance plan  (3)  Blood Glucose Monitor and log book  (4)   Food records for the 3 days prior to your visit    The Certified Diabetes Educator may make diabetes medication adjustments per the CDE Protocol and Collaborative Practice Agreement.                  Who to contact     If you have questions or need follow up information about today's clinic visit or your schedule please contact [facility] directly at [phone].  Normal or non-critical lab and imaging results will be communicated to you by MyChart, letter or phone within 4 business days after the clinic has received the results. If you do not hear from us within 7 days, please contact the clinic through MyChart or phone. If you  "have a critical or abnormal lab result, we will notify you by phone as soon as possible.  Submit refill requests through [employer] or call your pharmacy and they will forward the refill request to us. Please allow 3 business days for your refill to be completed.          Additional Information About Your Visit        iSnaphart Information     [employer] lets you send messages to your doctor, view your test results, renew your prescriptions, schedule appointments and more. To sign up, go to [email or web address] . Click on \"Log in\" on the left side of the screen, which will take you to the Welcome page. Then click on \"Sign up Now\" on the right side of the page.     You will be asked to enter the access code listed below, as well as some personal information. Please follow the directions to create your username and password.     Your access code is: [email or web address]  Expires: 2018 10:45 AM     Your access code will  in 90 days. If you need help or a new code, please call your [place] clinic or [phone].        Care EveryWhere ID     This is your Care EveryWhere ID. This could be used by other organizations to access your [place] medical records  [id number]        Your Vitals Were     Pulse Temperature Breastfeeding? BMI (Body Mass Index)          86 97.6  F (36.4  C) (Oral) No 34.68 kg/m2         Blood Pressure from Last 3 Encounters:   18 139/81   18 142/77   17 130/60    Weight from Last 3 Encounters:   18 152 lb (68.9 kg)   18 151 lb (68.5 kg)   17 148 lb (67.1 kg)              We Performed the Following     Albumin Random Urine Quantitative with Creat Ratio     DIABETES EDUCATOR REFERRAL     FOOT EXAM     Lipid panel reflex to direct LDL Fasting          Today's Medication Changes          These changes are accurate as of 18 10:09 AM.  If you have any questions, ask your nurse or doctor.               Start taking these medicines.        Dose/Directions    melatonin " 3 MG tablet   Used for:  Insomnia, unspecified type   Started by:  Jayden Munoz MD        1-2 po at bedtime as needed for sleep   Quantity:  60 tablet   Refills:  5         Stop taking these medicines if you haven't already. Please contact your care team if you have questions.     tiZANidine 4 MG tablet   Commonly known as:  ZANAFLEX   Stopped by:  Jayden Munoz MD                Where to get your medicines      These medications were sent to [facility] - [place], [place] - [address]  [address] [facility] [address]     Phone:  [phone]     melatonin 3 MG tablet                Primary Care Provider Office Phone # Fax #    Jayden Munoz [phone]       [address] [facility] [address]        Equal Access to Services     ROMERO NEWBY : Hadii aad ku hadasho Soomaali, waaxda luqadaha, qaybta kaalmada adeegyada, melissa mead . So [facility] [phone].    ATENCIÓN: Si habla español, tiene a moe disposición servicios gratuitos de asistencia lingüística. Llame al [phone].    We comply with applicable federal civil rights laws and [place] laws. We do not discriminate on the basis of race, color, national origin, age, disability, sex, sexual orientation, or gender identity.            Thank you!     Thank you for choosing [facility]  for your care. Our goal is always to provide you with excellent care. Hearing back from our patients is one way we can continue to improve our services. Please take a few minutes to complete the written survey that you may receive in the mail after your visit with us. Thank you!             Your Updated Medication List - Protect others around you: Learn how to safely use, store and throw away your medicines at www.disposemymeds.org.          This list is accurate as of 5/1/18 10:09 AM.  Always use your most recent med list.                    Brand Name Dispense Instructions for use Diagnosis    acetaminophen 500 MG tablet    TYLENOL    100 tablet    Take 2 tablets by mouth 2 times daily.    DJD (degenerative joint disease), LBP (low back pain)       ALEVE PO           amLODIPine 10 MG tablet    NORVASC    90 tablet    Take 1 tablet (10 mg) by mouth daily    Hypertension goal BP (blood pressure) < 140/90       aspirin 325 MG EC tablet      Take 1-2 tablets by mouth daily as needed.        calcium carbonate-vitamin D 600-200 MG-UNIT Tabs    calcium + D    200 tablet    Take 2 tablets by mouth daily.    Osteoporosis, post-menopausal       cholecalciferol 400 UNIT Tabs tablet    vitamin D3    100 tablet    Take 1 tablet by mouth daily.    Osteoporosis, post-menopausal       gabapentin 100 MG capsule    NEURONTIN    90 capsule    TAKE ONE CAPSULE BY MOUTH AT BEDTIME    Lumbar discogenic pain syndrome       glucosamine 500 MG Tabs      1 TABLET DAILY        losartan-hydrochlorothiazide 100-25 MG per tablet    HYZAAR    90 tablet    Take 1 tablet by mouth daily    Hypertension goal BP (blood pressure) < 140/90       melatonin 3 MG tablet     60 tablet    1-2 po at bedtime as needed for sleep    Insomnia, unspecified type       metFORMIN 750 MG 24 hr tablet    GLUCOPHAGE-XR    180 tablet    Take 2 tablets (1,500 mg) by mouth daily (with dinner)    Type 2 diabetes mellitus without complication, without long-term current use of insulin (H)       metoprolol succinate 200 MG 24 hr tablet    TOPROL-XL    90 tablet    Take 1 tablet (200 mg) by mouth daily    Hypertension goal BP (blood pressure) < 140/90       pravastatin 40 MG tablet    PRAVACHOL    90 tablet    Take 1 tablet (40 mg) by mouth daily    Hyperlipidemia LDL goal <100

## 2018-05-02 NOTE — PROGRESS NOTES
The cholesterol is a little high.  Stay on same cholesterol medication and keep working on healthy diet/exercise as you are able.    The urine protein test is normal.    Jayden Munoz MD

## 2018-05-03 ENCOUNTER — TELEPHONE (OUTPATIENT)
Dept: FAMILY MEDICINE | Facility: CLINIC | Age: 83
End: 2018-05-03

## 2018-05-03 NOTE — TELEPHONE ENCOUNTER
Patient was seen 5/1/18.    See lab result note:      Progress Notes      The cholesterol is a little high.  Stay on same cholesterol medication and keep working on healthy diet/exercise as you are able.     The urine protein test is normal.     Jayden Munoz MD        I called patient and advised her of lab result note and results/values.   I see a letter should be coming to her as well.    Patient verbalized understanding of and agreement with plan.    Tavia Garcia RN  [facility]

## 2018-05-03 NOTE — TELEPHONE ENCOUNTER
Diabetes Education Scheduling Outreach #1:    Call to patient to schedule. Pt set up for 5/22 at [facility] to meed with Diabetes Educator.    Pt is requesting a call from  to discuss recent lab work. Please call pt on home number, OK To LM. Thank You.    Cindy Bolton, [facility]  Diabetes and Nutrition Scheduling

## 2018-07-23 ENCOUNTER — OFFICE VISIT (OUTPATIENT)
Dept: FAMILY MEDICINE | Facility: CLINIC | Age: 83
End: 2018-07-23
Payer: COMMERCIAL

## 2018-07-23 VITALS
SYSTOLIC BLOOD PRESSURE: 132 MMHG | DIASTOLIC BLOOD PRESSURE: 72 MMHG | TEMPERATURE: 99.1 F | BODY MASS INDEX: 34.27 KG/M2 | WEIGHT: 150.2 LBS | HEART RATE: 90 BPM | OXYGEN SATURATION: 94 %

## 2018-07-23 DIAGNOSIS — E11.9 TYPE 2 DIABETES MELLITUS WITHOUT COMPLICATION, WITHOUT LONG-TERM CURRENT USE OF INSULIN (H): ICD-10-CM

## 2018-07-23 DIAGNOSIS — E66.9 OBESITY, UNSPECIFIED CLASSIFICATION, UNSPECIFIED OBESITY TYPE, UNSPECIFIED WHETHER SERIOUS COMORBIDITY PRESENT: ICD-10-CM

## 2018-07-23 DIAGNOSIS — R60.0 BILATERAL LOWER EXTREMITY EDEMA: ICD-10-CM

## 2018-07-23 DIAGNOSIS — I10 HYPERTENSION GOAL BP (BLOOD PRESSURE) < 140/90: Primary | ICD-10-CM

## 2018-07-23 PROCEDURE — 99214 OFFICE O/P EST MOD 30 MIN: CPT | Performed by: FAMILY MEDICINE

## 2018-07-23 RX ORDER — METOPROLOL SUCCINATE 200 MG/1
200 TABLET, EXTENDED RELEASE ORAL DAILY
Qty: 90 TABLET | Refills: 1 | Status: SHIPPED | OUTPATIENT
Start: 2018-07-23 | End: 2018-12-11

## 2018-07-23 RX ORDER — AMLODIPINE BESYLATE 5 MG/1
5 TABLET ORAL DAILY
Qty: 90 TABLET | Refills: 0 | Status: SHIPPED | OUTPATIENT
Start: 2018-07-23 | End: 2019-02-13

## 2018-07-23 RX ORDER — METFORMIN HYDROCHLORIDE 750 MG/1
1500 TABLET, EXTENDED RELEASE ORAL
Qty: 180 TABLET | Refills: 1 | Status: SHIPPED | OUTPATIENT
Start: 2018-07-23 | End: 2018-12-11

## 2018-07-23 RX ORDER — LOSARTAN POTASSIUM AND HYDROCHLOROTHIAZIDE 25; 100 MG/1; MG/1
1 TABLET ORAL DAILY
Qty: 90 TABLET | Refills: 1 | Status: SHIPPED | OUTPATIENT
Start: 2018-07-23 | End: 2019-01-04

## 2018-07-23 NOTE — PATIENT INSTRUCTIONS
Stop the 10 mg amlodipine    Start 5 mg amlodipine one pill daily    Continue other meds as is    Try to increase walking as able    Look at food labels, try to reduce sodium/ salt in diet    Plan to see Dr. Munoz in a couple months

## 2018-07-23 NOTE — MR AVS SNAPSHOT
After Visit Summary   7/23/2018    David Ng    MRN: [id number]           Patient Information     Date Of Birth          8/24/1934        Visit Information        Provider Department      7/23/2018 2:20 PM Jayden Munoz MD [facility]        Today's Diagnoses     Hypertension goal BP (blood pressure) < 140/90    -  1    Type 2 diabetes mellitus without complication, without long-term current use of insulin (H)          Care Instructions    Stop the 10 mg amlodipine    Start 5 mg amlodipine one pill daily    Continue other meds as is    Try to increase walking as able    Look at food labels, try to reduce sodium/ salt in diet    Plan to see Dr. Munoz in a couple months           Follow-ups after your visit        Who to contact     If you have questions or need follow up information about today's clinic visit or your schedule please contact [facility] directly at [phone].  Normal or non-critical lab and imaging results will be communicated to you by MyChart, letter or phone within 4 business days after the clinic has received the results. If you do not hear from us within 7 days, please contact the clinic through MyChart or phone. If you have a critical or abnormal lab result, we will notify you by phone as soon as possible.  Submit refill requests through [employer] ([employer]) or call your pharmacy and they will forward the refill request to us. Please allow 3 business days for your refill to be completed.          Additional Information About Your Visit        Care EveryWhere ID     This is your Care EveryWhere ID. This could be used by other organizations to access your [place] medical records  [id number]        Your Vitals Were     Pulse Temperature Pulse Oximetry BMI (Body Mass Index)          90 99.1  F (37.3  C) (Oral) 94% 34.27 kg/m2         Blood Pressure from Last 3 Encounters:   07/23/18 132/72   05/01/18 139/81   02/05/18 142/77    Weight from  Last 3 Encounters:   07/23/18 150 lb 3.2 oz (68.1 kg)   05/01/18 152 lb (68.9 kg)   02/05/18 151 lb (68.5 kg)              Today, you had the following     No orders found for display         Today's Medication Changes          These changes are accurate as of 7/23/18  3:07 PM.  If you have any questions, ask your nurse or doctor.               These medicines have changed or have updated prescriptions.        Dose/Directions    amLODIPine 5 MG tablet   Commonly known as:  NORVASC   This may have changed:    - medication strength  - how much to take   Used for:  Hypertension goal BP (blood pressure) < 140/90   Changed by:  Jayden Munoz MD        Dose:  5 mg   Take 1 tablet (5 mg) by mouth daily   Quantity:  90 tablet   Refills:  0            Where to get your medicines      These medications were sent to [facility] - [facility] [address] [facility]  [address], [facility] [address]     Phone:  [phone]     amLODIPine 5 MG tablet    losartan-hydrochlorothiazide 100-25 MG per tablet    metFORMIN 750 MG 24 hr tablet    metoprolol succinate 200 MG 24 hr tablet                Primary Care Provider Office Phone # Fax #    Jayden Munoz [phone]       [address] [facility] [address]        Equal Access to Services     ROMERO NEWBY : Hadii randal ku hadasho Soomaali, waaxda luqadaha, qaybta kaalmada adeegyada, waxay lorainein hayzhang ford. So [facility] [phone].    ATENCIÓN: Si habla español, tiene a moe disposición servicios gratuitos de asistencia lingüística. Llame al [phone].    We comply with applicable federal civil rights laws and [place] laws. We do not discriminate on the basis of race, color, national origin, age, disability, sex, sexual orientation, or gender identity.            Thank you!     Thank you for choosing [facility]  for your care. Our goal is always to provide you with excellent care.  Hearing back from our patients is one way we can continue to improve our services. Please take a few minutes to complete the written survey that you may receive in the mail after your visit with us. Thank you!             Your Updated Medication List - Protect others around you: Learn how to safely use, store and throw away your medicines at www.disposemymeds.org.          This list is accurate as of 7/23/18  3:07 PM.  Always use your most recent med list.                   Brand Name Dispense Instructions for use Diagnosis    acetaminophen 500 MG tablet    TYLENOL    100 tablet    Take 2 tablets by mouth 2 times daily.    DJD (degenerative joint disease), LBP (low back pain)       ALEVE PO           amLODIPine 5 MG tablet    NORVASC    90 tablet    Take 1 tablet (5 mg) by mouth daily    Hypertension goal BP (blood pressure) < 140/90       aspirin 325 MG EC tablet      Take 1-2 tablets by mouth daily as needed.        calcium carbonate-vitamin D 600-200 MG-UNIT Tabs    calcium + D    200 tablet    Take 2 tablets by mouth daily.    Osteoporosis, post-menopausal       cholecalciferol 400 UNIT Tabs tablet    vitamin D3    100 tablet    Take 1 tablet by mouth daily.    Osteoporosis, post-menopausal       gabapentin 100 MG capsule    NEURONTIN    90 capsule    TAKE ONE CAPSULE BY MOUTH AT BEDTIME    Lumbar discogenic pain syndrome       glucosamine 500 MG Tabs      1 TABLET DAILY        losartan-hydrochlorothiazide 100-25 MG per tablet    HYZAAR    90 tablet    Take 1 tablet by mouth daily    Hypertension goal BP (blood pressure) < 140/90       melatonin 3 MG tablet     60 tablet    1-2 po at bedtime as needed for sleep    Insomnia, unspecified type       metFORMIN 750 MG 24 hr tablet    GLUCOPHAGE-XR    180 tablet    Take 2 tablets (1,500 mg) by mouth daily (with dinner)    Type 2 diabetes mellitus without complication, without long-term current use of insulin (H)       metoprolol succinate 200 MG 24 hr tablet     TOPROL-XL    90 tablet    Take 1 tablet (200 mg) by mouth daily    Hypertension goal BP (blood pressure) < 140/90       pravastatin 40 MG tablet    PRAVACHOL    90 tablet    Take 1 tablet (40 mg) by mouth daily    Hyperlipidemia LDL goal <100

## 2018-07-23 NOTE — PROGRESS NOTES
SUBJECTIVE:   David Ng is a 83 year old female who presents to clinic today for the following health issues:      Patient is here for edema in both feet x 1 month.  Patient states today the edema is a little better.  No shortness of breath         Problem list and histories reviewed & adjusted, as indicated.  Additional history: as documented         Reviewed and updated as needed this visit by clinical staff  Tobacco  Allergies  Meds  Med Hx  Surg Hx  Fam Hx  Soc Hx      Reviewed and updated as needed this visit by Provider          blood pressure when she checks it is 139-140 over 70s      A little walking, not much      Physical Exam   Constitutional: She is oriented to person, place, and time and well-developed, well-nourished, and in no distress. No distress.   HENT:   Head: Normocephalic and atraumatic.   Neck: Carotid bruit is not present.   Cardiovascular: Normal rate, regular rhythm, normal heart sounds and intact distal pulses.  Exam reveals no gallop and no friction rub.    No murmur heard.  Pulmonary/Chest: Effort normal and breath sounds normal.   Musculoskeletal: She exhibits edema (slight; symmetric).   Neurological: She is alert and oriented to person, place, and time.   Skin: She is not diaphoretic.   Psychiatric: Mood and affect normal.         ASSESSMENT / PLAN:  (I10) Hypertension goal BP (blood pressure) < 140/90  (primary encounter diagnosis)  Comment: discussed in detail.  With the swelling, may be due in part to amlodipine.  Decrease this to 5 mg.  See us in 2 months, sooner if needed. Other meds as is.   Plan: amLODIPine (NORVASC) 5 MG tablet,         losartan-hydrochlorothiazide (HYZAAR) 100-25 MG        per tablet, metoprolol succinate (TOPROL-XL)         200 MG 24 hr tablet             (E11.9) Type 2 diabetes mellitus without complication, without long-term current use of insulin (H)  Comment: okay hemoglobin a1c last time  Plan: metFORMIN (GLUCOPHAGE-XR) 750 MG 24 hr  tablet             (E66.9) Obesity, unspecified classification, unspecified obesity type, unspecified whether serious comorbidity present  Comment: chronic   Plan: keep working on healthy diet/exercise and wt loss     (R60.0) Bilateral lower extremity edema  Comment: of note, no resp symptoms at all.   Plan: decrease amlodipine.  Increase walking.  Decrease salt intake.       See us in 2 months, sooner if needed       I reviewed the patient's medications, allergies, medical history, family history, and social history.    Jayden Munoz MD

## 2019-01-04 DIAGNOSIS — I10 HYPERTENSION GOAL BP (BLOOD PRESSURE) < 140/90: ICD-10-CM

## 2019-01-04 NOTE — TELEPHONE ENCOUNTER
"Requested Prescriptions   Pending Prescriptions Disp Refills     losartan-hydrochlorothiazide (HYZAAR) 100-25 MG tablet [Pharmacy Med Name: LOSARTAN POTASSIUM/HYDROCHLOROTHIAZIDE 100-25 MG Tablet] 90 tablet 1    Last Written Prescription Date:  7/23/18  Last Fill Quantity: 90,  # refills: 1   Last office visit: 7/23/2018 with prescribing provider:     Future Office Visit:     Sig: TAKE 1 TABLET EVERY DAY    Angiotensin-II Receptors Passed - 1/4/2019  2:56 PM       Passed - Blood pressure under 140/90 in past 12 months    BP Readings from Last 3 Encounters:   07/23/18 132/72   05/01/18 139/81   02/05/18 142/77                Passed - Recent (12 mo) or future (30 days) visit within the authorizing provider's specialty    Patient had office visit in the last 12 months or has a visit in the next 30 days with authorizing provider or within the authorizing provider's specialty.  See \"Patient Info\" tab in inbasket, or \"Choose Columns\" in Meds & Orders section of the refill encounter.             Passed - Medication is active on med list       Passed - Patient is age 18 or older       Passed - No active pregnancy on record       Passed - Normal serum creatinine on file in past 12 months    Recent Labs   Lab Test 02/05/18  0941  07/29/11   CR 0.56   < >  --    CRPOC  --   --  0.7    < > = values in this interval not displayed.            Passed - Normal serum potassium on file in past 12 months    Recent Labs   Lab Test 02/05/18  0941   POTASSIUM 4.0                   Passed - No positive pregnancy test in past 12 months          "

## 2019-01-07 RX ORDER — LOSARTAN POTASSIUM AND HYDROCHLOROTHIAZIDE 25; 100 MG/1; MG/1
TABLET ORAL
Qty: 30 TABLET | Refills: 0 | Status: SHIPPED | OUTPATIENT
Start: 2019-01-07 | End: 2019-01-31

## 2019-01-07 NOTE — TELEPHONE ENCOUNTER
Medication is being filled for 1 time refill only due to:  Patient needs to be seen because per copy of last note:.     See us in 2 months, sooner if needed

## 2019-01-16 ENCOUNTER — OFFICE VISIT (OUTPATIENT)
Dept: FAMILY MEDICINE | Facility: CLINIC | Age: 84
End: 2019-01-16
Payer: COMMERCIAL

## 2019-01-16 VITALS
HEIGHT: 55 IN | SYSTOLIC BLOOD PRESSURE: 121 MMHG | DIASTOLIC BLOOD PRESSURE: 66 MMHG | TEMPERATURE: 98 F | HEART RATE: 102 BPM | WEIGHT: 146 LBS | OXYGEN SATURATION: 95 % | BODY MASS INDEX: 33.79 KG/M2

## 2019-01-16 DIAGNOSIS — J01.90 ACUTE SINUSITIS WITH SYMPTOMS > 10 DAYS: Primary | ICD-10-CM

## 2019-01-16 DIAGNOSIS — I10 HYPERTENSION GOAL BP (BLOOD PRESSURE) < 140/90: ICD-10-CM

## 2019-01-16 PROCEDURE — 99213 OFFICE O/P EST LOW 20 MIN: CPT | Performed by: FAMILY MEDICINE

## 2019-01-16 RX ORDER — AMOXICILLIN 500 MG/1
500 CAPSULE ORAL 3 TIMES DAILY
Qty: 30 CAPSULE | Refills: 0 | Status: SHIPPED | OUTPATIENT
Start: 2019-01-16 | End: 2019-03-20

## 2019-01-16 ASSESSMENT — MIFFLIN-ST. JEOR: SCORE: 954.38

## 2019-01-16 NOTE — PATIENT INSTRUCTIONS
Stay well hydrated    Take the antibiotics    conitnue over the counter meds as needed    Follow up if symptoms not resolving    Plan to see us in the next couple months to check diabetes etc

## 2019-01-16 NOTE — PROGRESS NOTES
SUBJECTIVE:   David Ng is a 84 year old female who presents to clinic today for the following health issues:      ENT Symptoms             Symptoms: cc Present Absent Comment   Fever/Chills  x     Fatigue  x     Muscle Aches  x     Eye Irritation  x     Sneezing  x     Nasal Phil/Drg  x     Sinus Pressure/Pain  x     Loss of smell  x     Dental pain   x    Sore Throat  x     Swollen Glands  x     Ear Pain/Fullness  x  Both ears   Cough  x     Wheeze  x     Chest Pain   x    Shortness of breath   x    Rash   x    Other         Symptom duration:  10 days   Symptom severity:  severe   Treatments tried:  sudafed, lozenges   Contacts:            today a bit better but yest real bad        Problem list and histories reviewed & adjusted, as indicated.  Additional history: as documented         Reviewed and updated as needed this visit by clinical staff  Tobacco  Allergies  Meds  Med Hx  Surg Hx  Fam Hx  Soc Hx      Reviewed and updated as needed this visit by Provider       Physical Exam   Constitutional: She is oriented to person, place, and time. She appears well-developed and well-nourished.   HENT:   Head: Normocephalic and atraumatic.   Right Ear: Tympanic membrane, external ear and ear canal normal.   Left Ear: Tympanic membrane, external ear and ear canal normal.   Nose: Nose normal.   Mouth/Throat: Oropharynx is clear and moist.   Tender over sinuses and submandib area     Eyes: Conjunctivae are normal.   Neck: Neck supple.   Cardiovascular: Normal rate, regular rhythm and normal heart sounds.   Pulmonary/Chest: Effort normal and breath sounds normal. No respiratory distress. She exhibits no tenderness.   Abdominal: Soft. There is no tenderness.   Neurological: She is alert and oriented to person, place, and time.         ASSESSMENT / PLAN:  (J01.90) Acute sinusitis with symptoms > 10 days  (primary encounter diagnosis)  Comment: will go with course of antibiotics given duration of symptoms   Plan:  amoxicillin (AMOXIL) 500 MG capsule         Follow up if symptoms not resolving    Be seen promptly if symptoms acutely worsen     (I10) Hypertension goal BP (blood pressure) < 140/90  Comment: at goal   Plan: no change in meds    Patient advised to see us in next couple months for diabetest etc        I reviewed the patient's medications, allergies, medical history, family history, and social history.    Jayden Munoz MD

## 2019-01-31 DIAGNOSIS — I10 HYPERTENSION GOAL BP (BLOOD PRESSURE) < 140/90: ICD-10-CM

## 2019-01-31 RX ORDER — LOSARTAN POTASSIUM AND HYDROCHLOROTHIAZIDE 25; 100 MG/1; MG/1
TABLET ORAL
Qty: 90 TABLET | Refills: 1 | Status: SHIPPED | OUTPATIENT
Start: 2019-01-31 | End: 2019-02-13

## 2019-01-31 NOTE — TELEPHONE ENCOUNTER
"Requested Prescriptions   Pending Prescriptions Disp Refills     losartan-hydrochlorothiazide (HYZAAR) 100-25 MG tablet [Pharmacy Med Name: LOSARTAN POTASSIUM/HYDROCHLOROTHIAZIDE 100-25 MG Tablet] 30 tablet 0    Last Written Prescription Date:  1/7/19  Last Fill Quantity: 30,  # refills: 0  Last office visit: 1/16/2019 with prescribing provider:     Future Office Visit:     Sig: TAKE 1 TABLET ONE TIME DAILY (NEEDS TO BE SEEN)    Angiotensin-II Receptors Passed - 1/31/2019  1:20 PM       Passed - Blood pressure under 140/90 in past 12 months    BP Readings from Last 3 Encounters:   01/16/19 121/66   07/23/18 132/72   05/01/18 139/81                Passed - Recent (12 mo) or future (30 days) visit within the authorizing provider's specialty    Patient had office visit in the last 12 months or has a visit in the next 30 days with authorizing provider or within the authorizing provider's specialty.  See \"Patient Info\" tab in inbasket, or \"Choose Columns\" in Meds & Orders section of the refill encounter.             Passed - Medication is active on med list       Passed - Patient is age 18 or older       Passed - No active pregnancy on record       Passed - Normal serum creatinine on file in past 12 months    Recent Labs   Lab Test 02/05/18  0941  07/29/11   CR 0.56   < >  --    CRPOC  --   --  0.7    < > = values in this interval not displayed.            Passed - Normal serum potassium on file in past 12 months    Recent Labs   Lab Test 02/05/18  0941   POTASSIUM 4.0                   Passed - No positive pregnancy test in past 12 months          "

## 2019-03-20 ENCOUNTER — OFFICE VISIT (OUTPATIENT)
Dept: FAMILY MEDICINE | Facility: CLINIC | Age: 84
End: 2019-03-20
Payer: COMMERCIAL

## 2019-03-20 VITALS
SYSTOLIC BLOOD PRESSURE: 136 MMHG | HEART RATE: 89 BPM | BODY MASS INDEX: 33.79 KG/M2 | WEIGHT: 145.38 LBS | DIASTOLIC BLOOD PRESSURE: 75 MMHG | TEMPERATURE: 97.8 F

## 2019-03-20 DIAGNOSIS — J01.90 ACUTE SINUSITIS WITH SYMPTOMS > 10 DAYS: ICD-10-CM

## 2019-03-20 DIAGNOSIS — M79.10 MUSCLE PAIN: ICD-10-CM

## 2019-03-20 DIAGNOSIS — E78.5 HYPERLIPIDEMIA LDL GOAL <100: ICD-10-CM

## 2019-03-20 DIAGNOSIS — R53.83 FATIGUE, UNSPECIFIED TYPE: ICD-10-CM

## 2019-03-20 DIAGNOSIS — I10 HYPERTENSION GOAL BP (BLOOD PRESSURE) < 140/90: ICD-10-CM

## 2019-03-20 DIAGNOSIS — E11.9 TYPE 2 DIABETES MELLITUS WITHOUT COMPLICATION, WITHOUT LONG-TERM CURRENT USE OF INSULIN (H): Primary | ICD-10-CM

## 2019-03-20 LAB
ALBUMIN SERPL-MCNC: 3.9 G/DL (ref 3.4–5)
ALP SERPL-CCNC: 71 U/L (ref 40–150)
ALT SERPL W P-5'-P-CCNC: 34 U/L (ref 0–50)
ANION GAP SERPL CALCULATED.3IONS-SCNC: 10 MMOL/L (ref 3–14)
AST SERPL W P-5'-P-CCNC: 27 U/L (ref 0–45)
BASOPHILS # BLD AUTO: 0 10E9/L (ref 0–0.2)
BASOPHILS NFR BLD AUTO: 0.4 %
BILIRUB SERPL-MCNC: 0.4 MG/DL (ref 0.2–1.3)
BUN SERPL-MCNC: 21 MG/DL (ref 7–30)
CALCIUM SERPL-MCNC: 9.6 MG/DL (ref 8.5–10.1)
CHLORIDE SERPL-SCNC: 104 MMOL/L (ref 94–109)
CHOLEST SERPL-MCNC: 221 MG/DL
CO2 SERPL-SCNC: 25 MMOL/L (ref 20–32)
CREAT SERPL-MCNC: 0.71 MG/DL (ref 0.52–1.04)
DIFFERENTIAL METHOD BLD: ABNORMAL
EOSINOPHIL # BLD AUTO: 0.1 10E9/L (ref 0–0.7)
EOSINOPHIL NFR BLD AUTO: 1.6 %
ERYTHROCYTE [DISTWIDTH] IN BLOOD BY AUTOMATED COUNT: 18.6 % (ref 10–15)
GFR SERPL CREATININE-BSD FRML MDRD: 78 ML/MIN/{1.73_M2}
GLUCOSE SERPL-MCNC: 115 MG/DL (ref 70–99)
HBA1C MFR BLD: 6.4 % (ref 0–5.6)
HCT VFR BLD AUTO: 40.9 % (ref 35–47)
HDLC SERPL-MCNC: 46 MG/DL
HGB BLD-MCNC: 13.4 G/DL (ref 11.7–15.7)
LDLC SERPL CALC-MCNC: 130 MG/DL
LYMPHOCYTES # BLD AUTO: 2.7 10E9/L (ref 0.8–5.3)
LYMPHOCYTES NFR BLD AUTO: 38.6 %
MCH RBC QN AUTO: 21.4 PG (ref 26.5–33)
MCHC RBC AUTO-ENTMCNC: 32.8 G/DL (ref 31.5–36.5)
MCV RBC AUTO: 65 FL (ref 78–100)
MONOCYTES # BLD AUTO: 0.6 10E9/L (ref 0–1.3)
MONOCYTES NFR BLD AUTO: 8.2 %
NEUTROPHILS # BLD AUTO: 3.5 10E9/L (ref 1.6–8.3)
NEUTROPHILS NFR BLD AUTO: 51.2 %
NONHDLC SERPL-MCNC: 175 MG/DL
PLATELET # BLD AUTO: 322 10E9/L (ref 150–450)
PLATELET # BLD EST: ABNORMAL 10*3/UL
POTASSIUM SERPL-SCNC: 4.2 MMOL/L (ref 3.4–5.3)
PROT SERPL-MCNC: 8.1 G/DL (ref 6.8–8.8)
RBC # BLD AUTO: 6.27 10E12/L (ref 3.8–5.2)
RBC MORPH BLD: NORMAL
SODIUM SERPL-SCNC: 139 MMOL/L (ref 133–144)
TRIGL SERPL-MCNC: 223 MG/DL
TSH SERPL DL<=0.005 MIU/L-ACNC: 3.23 MU/L (ref 0.4–4)
WBC # BLD AUTO: 6.9 10E9/L (ref 4–11)

## 2019-03-20 PROCEDURE — 80053 COMPREHEN METABOLIC PANEL: CPT | Performed by: FAMILY MEDICINE

## 2019-03-20 PROCEDURE — 83036 HEMOGLOBIN GLYCOSYLATED A1C: CPT | Performed by: FAMILY MEDICINE

## 2019-03-20 PROCEDURE — 36415 COLL VENOUS BLD VENIPUNCTURE: CPT | Performed by: FAMILY MEDICINE

## 2019-03-20 PROCEDURE — 85025 COMPLETE CBC W/AUTO DIFF WBC: CPT | Performed by: FAMILY MEDICINE

## 2019-03-20 PROCEDURE — 84443 ASSAY THYROID STIM HORMONE: CPT | Performed by: FAMILY MEDICINE

## 2019-03-20 PROCEDURE — 99214 OFFICE O/P EST MOD 30 MIN: CPT | Performed by: FAMILY MEDICINE

## 2019-03-20 PROCEDURE — 80061 LIPID PANEL: CPT | Performed by: FAMILY MEDICINE

## 2019-03-20 RX ORDER — CYCLOBENZAPRINE HCL 5 MG
5 TABLET ORAL 3 TIMES DAILY PRN
Qty: 30 TABLET | Refills: 1 | Status: SHIPPED | OUTPATIENT
Start: 2019-03-20 | End: 2020-09-18

## 2019-03-20 RX ORDER — AMOXICILLIN 500 MG/1
500 CAPSULE ORAL 3 TIMES DAILY
Qty: 30 CAPSULE | Refills: 0 | Status: SHIPPED | OUTPATIENT
Start: 2019-03-20 | End: 2019-12-10

## 2019-03-20 ASSESSMENT — PAIN SCALES - GENERAL: PAINLEVEL: NO PAIN (0)

## 2019-03-20 NOTE — PROGRESS NOTES
SUBJECTIVE:   David Ng is a 84 year old female who presents to clinic today for the following health issues:       Diabetes Follow-up      Patient is checking blood sugars: not at all    Diabetic concerns: None     Symptoms of hypoglycemia (low blood sugar): none     Paresthesias (numbness or burning in feet) or sores: No     Date of last diabetic eye exam: 07/2017    BP Readings from Last 2 Encounters:   01/16/19 121/66   07/23/18 132/72     Hemoglobin A1C (%)   Date Value   02/05/2018 6.4 (H)   03/28/2017 6.7 (H)     LDL Cholesterol Calculated (mg/dL)   Date Value   05/01/2018 121 (H)   03/28/2017 92       Diabetes Management Resources    Amount of exercise or physical activity: None    Problems taking medications regularly: No    Medication side effects: none    Diet: regular (no restrictions)        none    Problem list and histories reviewed & adjusted, as indicated.  Additional history: as documented         Reviewed and updated as needed this visit by clinical staff  Tobacco  Allergies  Meds  Med Hx  Surg Hx  Fam Hx  Soc Hx      Reviewed and updated as needed this visit by Provider          sore throat     Aches     Little fever    For a week now these symptoms     Maybe a little better?    Had been on amox, feels like she needs more    Would like refill cyclobenzaprine , helps muscle pain /spasm    Eating healthy diet    Physical Exam   Constitutional: She is oriented to person, place, and time. She appears well-developed and well-nourished.   HENT:   Head: Normocephalic and atraumatic.   Right Ear: External ear normal.   Left Ear: External ear normal.   Eyes: Conjunctivae are normal.   Neck: Carotid bruit is not present.   Cardiovascular: Normal rate, regular rhythm and normal heart sounds.   Pulmonary/Chest: Effort normal and breath sounds normal. No respiratory distress.   Musculoskeletal: She exhibits no edema.   Neurological: She is alert and oriented to person, place, and time.     Throat  okay; mild soreness over sinuses and submandib areas    ASSESSMENT / PLAN:  (E11.9) Type 2 diabetes mellitus without complication, without long-term current use of insulin (H)  (primary encounter diagnosis)  Comment: check hemoglobin a1c ; could see diab ed if needed.  Advised she schedule eye exam.   Plan: DIABETES EDUCATOR REFERRAL, OPTOMETRY REFERRAL,        Hemoglobin A1c             (E78.5) Hyperlipidemia LDL goal <100  Comment: check labs fasting   Plan: Comprehensive metabolic panel, Lipid panel         reflex to direct LDL Fasting        On prav     (R53.83) Fatigue, unspecified type  Comment: check labs   Plan: TSH with free T4 reflex, CBC with platelets         differential             (J01.90) Acute sinusitis with symptoms > 10 days  Comment: refill for patient   Plan: amoxicillin (AMOXIL) 500 MG capsule        Follow up prn symptoms     (M79.10) Muscle pain  Comment: just uses occasionally prn   Plan: cyclobenzaprine (FLEXERIL) 5 MG tablet        Refill     (I10) Hypertension goal BP (blood pressure) < 140/90  Comment: barely at goal   Plan: no change in meds       I reviewed the patient's medications, allergies, medical history, family history, and social history.    Jayden Munoz MD

## 2019-03-20 NOTE — LETTER
[facility]   [address] [place]  [address]  [phone]                                   March 21, 2019    David Ng  [address] [place] [address]        Dear David,    The diabetes test ( hemoglobin a1c ) is fine.    Cholesterol is a little higher than last time.  Stay on the pravastatin and keep working on healthy diet/exercise.     Other labs are okay.    Results for orders placed or performed in visit on 03/20/19   Hemoglobin A1c   Result Value Ref Range    Hemoglobin A1C 6.4 (H) 0 - 5.6 %   TSH with free T4 reflex   Result Value Ref Range    TSH 3.23 0.40 - 4.00 mU/L   Comprehensive metabolic panel   Result Value Ref Range    Sodium 139 133 - 144 mmol/L    Potassium 4.2 3.4 - 5.3 mmol/L    Chloride 104 94 - 109 mmol/L    Carbon Dioxide 25 20 - 32 mmol/L    Anion Gap 10 3 - 14 mmol/L    Glucose 115 (H) 70 - 99 mg/dL    Urea Nitrogen 21 7 - 30 mg/dL    Creatinine 0.71 0.52 - 1.04 mg/dL    GFR Estimate 78 >60 mL/min/[1.73_m2]    GFR Estimate If Black >90 >60 mL/min/[1.73_m2]    Calcium 9.6 8.5 - 10.1 mg/dL    Bilirubin Total 0.4 0.2 - 1.3 mg/dL    Albumin 3.9 3.4 - 5.0 g/dL    Protein Total 8.1 6.8 - 8.8 g/dL    Alkaline Phosphatase 71 40 - 150 U/L    ALT 34 0 - 50 U/L    AST 27 0 - 45 U/L   CBC with platelets differential   Result Value Ref Range    WBC 6.9 4.0 - 11.0 10e9/L    RBC Count 6.27 (H) 3.8 - 5.2 10e12/L    Hemoglobin 13.4 11.7 - 15.7 g/dL    Hematocrit 40.9 35.0 - 47.0 %    MCV 65 (L) 78 - 100 fl    MCH 21.4 (L) 26.5 - 33.0 pg    MCHC 32.8 31.5 - 36.5 g/dL    RDW 18.6 (H) 10.0 - 15.0 %    Platelet Count 322 150 - 450 10e9/L    Diff Method Automated Method     % Neutrophils 51.2 %    % Lymphocytes 38.6 %    % Monocytes 8.2 %    % Eosinophils 1.6 %    % Basophils 0.4 %    Absolute Neutrophil 3.5 1.6 - 8.3 10e9/L    Absolute Lymphocytes 2.7 0.8 - 5.3 10e9/L    Absolute Monocytes 0.6 0.0 - 1.3 10e9/L    Absolute Eosinophils 0.1 0.0 - 0.7  10e9/L    Absolute Basophils 0.0 0.0 - 0.2 10e9/L    RBC Morphology Normal     Platelet Estimate       Automated count confirmed.  Platelet morphology is normal.   Lipid panel reflex to direct LDL Fasting   Result Value Ref Range    Cholesterol 221 (H) <200 mg/dL    Triglycerides 223 (H) <150 mg/dL    HDL Cholesterol 46 (L) >49 mg/dL    LDL Cholesterol Calculated 130 (H) <100 mg/dL    Non HDL Cholesterol 175 (H) <130 mg/dL       If you have any questions please call the clinic at [phone]    Sincerely,    Jayden Munoz MD  [facility]

## 2019-03-20 NOTE — PATIENT INSTRUCTIONS
We will send you lab results    Sent in amoxicillin and cyclobenazaprine    Keep working on healthy diet/exercise     Be seen promptly if symptoms acutely worsen

## 2019-03-21 NOTE — RESULT ENCOUNTER NOTE
The diabetes test ( hemoglobin a1c ) is fine.    Cholesterol is a little higher than last time.  Stay on the pravastatin and keep working on healthy diet/exercise.     Other labs are okay.    Jayden Munoz MD

## 2019-04-01 ENCOUNTER — ALLIED HEALTH/NURSE VISIT (OUTPATIENT)
Dept: EDUCATION SERVICES | Facility: CLINIC | Age: 84
End: 2019-04-01
Payer: COMMERCIAL

## 2019-04-01 VITALS — BODY MASS INDEX: 34.17 KG/M2 | WEIGHT: 147 LBS

## 2019-04-01 DIAGNOSIS — E11.9 TYPE 2 DIABETES, HBA1C GOAL < 8% (H): Primary | ICD-10-CM

## 2019-04-01 PROCEDURE — G0108 DIAB MANAGE TRN  PER INDIV: HCPCS

## 2019-04-01 NOTE — PROGRESS NOTES
"Diabetes Self-Management Education & Support    Diabetes Education Self Management & Training    SUBJECTIVE/OBJECTIVE:  Presents for: Individual review  Accompanied by: Self  Diabetes education in the past 24mo: No  Diabetes type: Type 2  Date of diagnosis: David has had diabetes for a couple of years  Diabetes management related comments/concerns: She is wondering about her diabetes lab results  Transportation concerns: No  Other concerns:: None  Cultural Influences/Ethnic Background:  Kos[place]    Diabetes Symptoms & Complications  Blurred vision: No  Fatigue: No(only when she is busy)  Neuropathy: No  Foot ulcerations: No  Polydipsia: No  Polyphagia: No  Polyuria: No  Visual change: No  Weakness: No  Weight loss: No  Slow healing wounds: No  Recent Infection(s): No  Weight trend: Stable  Autonomic neuropathy: No  CVA: No  Heart disease: No  Nephropathy: No  Peripheral neuropathy: No  Peripheral Vascular Disease: No  Retinopathy: No  Sexual dysfunction: No    Patient Problem List and Family Medical History reviewed for relevant medical history, current medical status, and diabetes risk factors.    Vitals:  Wt 66.7 kg (147 lb)   BMI 34.17 kg/m    Estimated body mass index is 34.17 kg/m  as calculated from the following:    Height as of 1/16/19: 1.397 m (4' 7\").    Weight as of this encounter: 66.7 kg (147 lb).   Last 3 BP:   BP Readings from Last 3 Encounters:   03/20/19 136/75   01/16/19 121/66   07/23/18 132/72       History   Smoking Status     Never Smoker   Smokeless Tobacco     Never Used     Comment: No second hand exposure.       Labs:  Lab Results   Component Value Date    A1C 6.4 03/20/2019     Lab Results   Component Value Date     03/20/2019     Lab Results   Component Value Date     03/20/2019     HDL Cholesterol   Date Value Ref Range Status   03/20/2019 46 (L) >49 mg/dL Final   ]  GFR Estimate   Date Value Ref Range Status   03/20/2019 78 >60 mL/min/[1.73_m2] Final     Comment:     Non "  GFR Calc  Starting 12/18/2018, serum creatinine based estimated GFR (eGFR) will be   calculated using the Chronic Kidney Disease Epidemiology Collaboration   (CKD-EPI) equation.       GFR Estimate If Black   Date Value Ref Range Status   03/20/2019 >90 >60 mL/min/[1.73_m2] Final     Comment:      GFR Calc  Starting 12/18/2018, serum creatinine based estimated GFR (eGFR) will be   calculated using the Chronic Kidney Disease Epidemiology Collaboration   (CKD-EPI) equation.       Lab Results   Component Value Date    CR 0.71 03/20/2019     No results found for: MICROALBUMIN    Healthy Eating  Healthy Eating Assessed Today: Yes  Cultural/Samaritan diet restrictions?: No  Meals include: Breakfast, Lunch, Dinner  Breakfast: toast with peanut butter and honey and cappuccino with milk   Dinner: 5:30: chicken, beef, vegetables, salad, pasta  Snacks: fruit  Beverages: Milk, Water  Has patient met with a dietitian in the past?: No    Being Active  Being Active Assessed Today: Yes  Exercise:: Currently not exercising  Barrier to exercise: None    Monitoring  Monitoring Assessed Today: Yes  Did patient bring glucose meter to appointment? : No(patient not testing at home)    Taking Medications  Diabetes Medication(s)     Biguanides       metFORMIN (GLUCOPHAGE-XR) 750 MG 24 hr tablet    TAKE 2 TABLETS (1,500 MG) BY MOUTH DAILY (WITH DINNER)          Taking Medication Assessed Today: Yes  Current Treatments: Oral Agent (monotherapy)  Problems taking diabetes medications regularly?: Yes  Diabetes medication side effects?: No  Treatment Compliance: All of the time    Problem Solving  Problem Solving Assessed Today: Yes    Reducing Risks  Reducing Risks Assessed Today: Yes  Has dilated eye exam at least once a year?: Yes  Sees dentist every 6 months?: Yes  Sees podiatrist (foot doctor)?: No    Healthy Coping  Healthy Coping Assessed Today: Yes  Informal Support system:: None  Support resources:  None  Patient Activation Measure Survey Score:  No flowsheet data found.    ASSESSMENT:  This is David's first visit to diabetes education.  She states that she would like to know about the diabetes labs that she recently had done. David is doing great with lifestyle management of diabetes. She seems to eat a pretty healthy diet with a good variety of vegetables, lean protein, fruits and carbohydrates.         Patient's most recent   Lab Results   Component Value Date    A1C 6.4 03/20/2019    is meeting goal of <7.0    INTERVENTION:   Discussion:  Today we discussed David's A1C and glucose results from her lab work.  We discussed A1C and BG goals.  Discussed David's diet and discussed healthy nutrition. Use food models to demonstrate portion sizes.  We discussed importance of exercise in diabetes management.  David plans to do light walking around her house.       Diabetes knowledge and skills assessment:     Patient is knowledgeable in diabetes management concepts related to: Healthy Eating, Being Active, Monitoring, Taking Medication, Problem Solving, Reducing Risks and Healthy Coping    Patient needs further education on the following diabetes management concepts: none at this time    Based on learning assessment above, most appropriate setting for further diabetes education would be: Individual setting.    Education provided today on:  AADE Self-Care Behaviors:  Diabetes Pathophysiology  Healthy Eating: portion control and plate planning method  Being Active: relationship to blood glucose, describe appropriate activity program and precautions to take  Reducing Risks: prevention, early diagnostic measures and treatment of complications, appropriate dental care, annual eye exam and A1C - goals, relating to blood glucose levels, how often to check    Opportunities for ongoing education and support in diabetes-self management were discussed.    Pt verbalized understanding of concepts discussed and recommendations  provided today.       Education Materials Provided:  My Plate Planner    PLAN:  See Patient Instructions for co-developed, patient-stated behavior change goals.  AVS printed and provided to patient today. See Follow-Up section for recommended follow-up.    Charlotte Fenton RD, LD, CDE  Time Spent: 30 minutes  Encounter Type: Individual    Any diabetes medication dose changes were made via the CDE Protocol and Collaborative Practice Agreement with the patient's referring provider. A copy of this encounter was shared with the provider.

## 2019-04-01 NOTE — TELEPHONE ENCOUNTER
Requested Prescriptions   Pending Prescriptions Disp Refills     tiZANidine (ZANAFLEX) 4 MG tablet [Pharmacy Med Name: TIZANIDINE HCL 4 MG Tablet] 90 tablet 0     Sig: TAKE 1 TABLET THREE TIMES DAILY AS NEEDED FOR MUSCLE SPASM(S)    There is no refill protocol information for this order         Last Written Prescription Date:  na  Last Fill Quantity: na,   # refills: na  Last Office Visit: na  Future Office visit:       Routing refill request to provider for review/approval because:  Drug not active on patient's medication list

## 2019-04-02 NOTE — TELEPHONE ENCOUNTER
Refused medication with note to pharmacy - discontinued 3/20/2019 - replaced with Flexeril.    Brittany Moody RN  [facility]

## 2019-04-02 NOTE — TELEPHONE ENCOUNTER
At appointment March 20 patient received prescription for another muscle relaxer     Please clarify if she got that and if she is taking it    Thanks    Jayden Munoz MD

## 2019-07-01 DIAGNOSIS — E11.9 TYPE 2 DIABETES MELLITUS WITHOUT COMPLICATION, WITHOUT LONG-TERM CURRENT USE OF INSULIN (H): ICD-10-CM

## 2019-07-01 DIAGNOSIS — I10 HYPERTENSION GOAL BP (BLOOD PRESSURE) < 140/90: ICD-10-CM

## 2019-07-01 DIAGNOSIS — E78.5 HYPERLIPIDEMIA LDL GOAL <100: ICD-10-CM

## 2019-07-01 RX ORDER — METOPROLOL SUCCINATE 200 MG/1
TABLET, EXTENDED RELEASE ORAL
Qty: 90 TABLET | Refills: 1 | Status: SHIPPED | OUTPATIENT
Start: 2019-07-01 | End: 2020-06-27

## 2019-07-01 RX ORDER — METFORMIN HYDROCHLORIDE 750 MG/1
TABLET, EXTENDED RELEASE ORAL
Qty: 180 TABLET | Refills: 1 | Status: SHIPPED | OUTPATIENT
Start: 2019-07-01 | End: 2020-05-06

## 2019-07-01 RX ORDER — PRAVASTATIN SODIUM 40 MG
TABLET ORAL
Qty: 90 TABLET | Refills: 3 | Status: SHIPPED | OUTPATIENT
Start: 2019-07-01 | End: 2020-04-20

## 2019-07-01 RX ORDER — AMLODIPINE BESYLATE 5 MG/1
TABLET ORAL
Qty: 90 TABLET | Refills: 1 | Status: SHIPPED | OUTPATIENT
Start: 2019-07-01 | End: 2019-12-26

## 2019-07-01 NOTE — TELEPHONE ENCOUNTER
"Requested Prescriptions   Pending Prescriptions Disp Refills     pravastatin (PRAVACHOL) 40 MG tablet [Pharmacy Med Name: PRAVASTATIN SODIUM 40 MG Tablet] 90 tablet 1     Sig: TAKE 1 TABLET EVERY DAY   Last Written Prescription Date:  2-14-19  Last Fill Quantity: 90,  # refills: 1   Last office visit: 3/20/2019 with prescribing provider:  3-20-19   Future Office Visit:        Statins Protocol Passed - 7/1/2019  4:53 PM        Passed - LDL on file in past 12 months     Recent Labs   Lab Test 03/20/19  1033   *             Passed - No abnormal creatine kinase in past 12 months     Recent Labs   Lab Test 06/21/12  0820                   Passed - Recent (12 mo) or future (30 days) visit within the authorizing provider's specialty     Patient had office visit in the last 12 months or has a visit in the next 30 days with authorizing provider or within the authorizing provider's specialty.  See \"Patient Info\" tab in inbasket, or \"Choose Columns\" in Meds & Orders section of the refill encounter.              Passed - Medication is active on med list        Passed - Patient is age 18 or older        Passed - No active pregnancy on record        Passed - No positive pregnancy test in past 12 months        metoprolol succinate ER (TOPROL-XL) 200 MG 24 hr tablet [Pharmacy Med Name: METOPROLOL SUCCINATE  MG Tablet Extended Release 24 Hour] 90 tablet 1     Sig: TAKE 1 TABLET EVERY DAY   Last Written Prescription Date:  2-14-19  Last Fill Quantity: 90,  # refills: 1   Last office visit: 3/20/2019 with prescribing provider:     Future Office Visit:        Beta-Blockers Protocol Passed - 7/1/2019  4:53 PM        Passed - Blood pressure under 140/90 in past 12 months     BP Readings from Last 3 Encounters:   03/20/19 136/75   01/16/19 121/66   07/23/18 132/72                 Passed - Patient is age 6 or older        Passed - Recent (12 mo) or future (30 days) visit within the authorizing provider's specialty     " "Patient had office visit in the last 12 months or has a visit in the next 30 days with authorizing provider or within the authorizing provider's specialty.  See \"Patient Info\" tab in inbasket, or \"Choose Columns\" in Meds & Orders section of the refill encounter.              Passed - Medication is active on med list        metFORMIN (GLUCOPHAGE-XR) 750 MG 24 hr tablet [Pharmacy Med Name: METFORMIN HYDROCHLORIDE  MG Tablet Extended Release 24 Hour] 180 tablet 1     Sig: TAKE 2 TABLETS (1,500 MG) DAILY (WITH DINNER)   Last Written Prescription Date:  2-14-19  Last Fill Quantity: 180,  # refills: 1   Last office visit: 3/20/2019 with prescribing provider:     Future Office Visit:        Biguanide Agents Passed - 7/1/2019  4:53 PM        Passed - Blood pressure less than 140/90 in past 6 months     BP Readings from Last 3 Encounters:   03/20/19 136/75   01/16/19 121/66   07/23/18 132/72                 Passed - Patient has documented LDL within the past 12 mos.     Recent Labs   Lab Test 03/20/19  1033   *             Passed - Patient has had a Microalbumin in the past 15 mos.     Recent Labs   Lab Test 05/01/18  1032   MICROL 10   UMALCR 13.92             Passed - Patient is age 10 or older        Passed - Patient has documented A1c within the specified period of time.     If HgbA1C is 8 or greater, it needs to be on file within the past 3 months.  If less than 8, must be on file within the past 6 months.     Recent Labs   Lab Test 03/20/19  1033   A1C 6.4*             Passed - Patient's CR is NOT>1.4 OR Patient's EGFR is NOT<45 within past 12 mos.     Recent Labs   Lab Test 03/20/19  1033  07/29/11   GFR  --   --  84   GFRB  --   --  81   GFRESTIMATED 78   < >  --    GFRESTBLACK >90   < >  --     < > = values in this interval not displayed.       Recent Labs   Lab Test 03/20/19  1033  07/29/11   CR 0.71   < >  --    CRPOC  --   --  0.7    < > = values in this interval not displayed.             Passed - " "Patient does NOT have a diagnosis of CHF.        Passed - Medication is active on med list        Passed - Patient is not pregnant        Passed - Patient has not had a positive pregnancy test within the past 12 mos.         Passed - Recent (6 mo) or future (30 days) visit within the authorizing provider's specialty     Patient had office visit in the last 6 months or has a visit in the next 30 days with authorizing provider or within the authorizing provider's specialty.  See \"Patient Info\" tab in inbasket, or \"Choose Columns\" in Meds & Orders section of the refill encounter.            amLODIPine (NORVASC) 5 MG tablet [Pharmacy Med Name: AMLODIPINE BESYLATE 5 MG Tablet] 90 tablet 1     Sig: TAKE 1 TABLET EVERY DAY   Last Written Prescription Date:  2-14-19  Last Fill Quantity: 90,  # refills: 1   Last office visit: 3/20/2019 with prescribing provider:     Future Office Visit:        Calcium Channel Blockers Protocol  Passed - 7/1/2019  4:53 PM        Passed - Blood pressure under 140/90 in past 12 months     BP Readings from Last 3 Encounters:   03/20/19 136/75   01/16/19 121/66   07/23/18 132/72                 Passed - Recent (12 mo) or future (30 days) visit within the authorizing provider's specialty     Patient had office visit in the last 12 months or has a visit in the next 30 days with authorizing provider or within the authorizing provider's specialty.  See \"Patient Info\" tab in inbasket, or \"Choose Columns\" in Meds & Orders section of the refill encounter.              Passed - Medication is active on med list        Passed - Patient is age 18 or older        Passed - No active pregnancy on record        Passed - Normal serum creatinine on file in past 12 months     Recent Labs   Lab Test 03/20/19  1033  07/29/11   CR 0.71   < >  --    CRPOC  --   --  0.7    < > = values in this interval not displayed.             Passed - No positive pregnancy test in past 12 months          "

## 2019-07-02 NOTE — TELEPHONE ENCOUNTER
Pres[place] per [facility] [place].  Ernestine Bustamante RN

## 2019-12-09 ENCOUNTER — OFFICE VISIT (OUTPATIENT)
Dept: OPTOMETRY | Facility: CLINIC | Age: 84
End: 2019-12-09
Payer: COMMERCIAL

## 2019-12-09 DIAGNOSIS — H53.9 VISUAL DISTURBANCE: Primary | ICD-10-CM

## 2019-12-09 DIAGNOSIS — G43.109 OCULAR MIGRAINE: ICD-10-CM

## 2019-12-09 PROCEDURE — 99213 OFFICE O/P EST LOW 20 MIN: CPT | Performed by: OPTOMETRIST

## 2019-12-09 ASSESSMENT — SLIT LAMP EXAM - LIDS
COMMENTS: NORMAL
COMMENTS: NORMAL

## 2019-12-09 ASSESSMENT — EXTERNAL EXAM - LEFT EYE: OS_EXAM: NORMAL

## 2019-12-09 ASSESSMENT — CONF VISUAL FIELD
OS_NORMAL: 1
OD_NORMAL: 1

## 2019-12-09 ASSESSMENT — CUP TO DISC RATIO
OS_RATIO: 0.3
OD_RATIO: 0.3

## 2019-12-09 ASSESSMENT — EXTERNAL EXAM - RIGHT EYE: OD_EXAM: NORMAL

## 2019-12-09 ASSESSMENT — VISUAL ACUITY
METHOD: SNELLEN - LINEAR
OD_CC: 20/25
OS_PH_CC: 20/30-
OS_CC: 20/40
CORRECTION_TYPE: GLASSES

## 2019-12-09 NOTE — PROGRESS NOTES
Chief Complaint   Patient presents with     Red Eye Both Eyes       Do you wear contact lenses? Kerline Nino, Optometric Tech     See Review Of Systems   Eyes: visual disturbance both eyes (spots in vision)  Constitutional: No fevers, chills, or weight changes.    Medical, surgical and family histories reviewed and updated 12/9/2019.         OBJECTIVE: See Ophthalmology exam    ASSESSMENT:    ICD-10-CM    1. Visual disturbance H53.9    2. Ocular migraine G43.109       PLAN:    Patient Instructions   Ocular migraines are painless, temporary visual disturbances that can affect one or both eyes. Though they can be frightening, ocular migraines typically are harmless.  Some people may find that taking a pain reliever such as tylenol or ibuprofen may lessen the length of the visual changes.  Ocular or eye migraines can be triggered by certain foods, caffeinated drinks, red wine, smoked meats and chocolate.  Food additives such as MSG and artificial sweeteners may also trigger the migraines in some people.  A person should seek immediate medical attention in cases such as:  A noticeable disruption of any of the five basic senses (seeing, hearing, smelling, tasting, touching).   A sudden change in mental status, such as unusual memory loss or inability to recognize a familiar face.   Difficulty in ambulating, which includes walking in a straight line or standing in place without falling.   Difficulty in communicating, such as slurred speech    Patient is no longer experiencing symptoms during the appointment. No ocular signs that would be consistent with symptoms. Symptoms of red spots in entire field of view (both eyes) for extended period likely neurological. Recommend evaluation with neurology- will enter referral today.     Nitin Jacobson O.D.  [address] [place]  [address]    [phone]

## 2019-12-09 NOTE — LETTER
12/9/2019         RE: David Ng  [address] [place] [address]        Dear Colleague,    Thank you for referring your patient, David Ng, to the [facility]. Please see a copy of my visit note below.    Chief Complaint   Patient presents with     Red Eye Both Eyes       Do you wear contact lenses? No    Elvia Nino, Optometric Tech     See Review Of Systems   Eyes: visual disturbance both eyes (spots in vision)  Constitutional: No fevers, chills, or weight changes.    Medical, surgical and family histories reviewed and updated 12/9/2019.         OBJECTIVE: See Ophthalmology exam    ASSESSMENT:    ICD-10-CM    1. Visual disturbance H53.9    2. Ocular migraine G43.109       PLAN:    Patient Instructions   Ocular migraines are painless, temporary visual disturbances that can affect one or both eyes. Though they can be frightening, ocular migraines typically are harmless.  Some people may find that taking a pain reliever such as tylenol or ibuprofen may lessen the length of the visual changes.  Ocular or eye migraines can be triggered by certain foods, caffeinated drinks, red wine, smoked meats and chocolate.  Food additives such as MSG and artificial sweeteners may also trigger the migraines in some people.  A person should seek immediate medical attention in cases such as:  A noticeable disruption of any of the five basic senses (seeing, hearing, smelling, tasting, touching).   A sudden change in mental status, such as unusual memory loss or inability to recognize a familiar face.   Difficulty in ambulating, which includes walking in a straight line or standing in place without falling.   Difficulty in communicating, such as slurred speech    Patient is no longer experiencing symptoms during the appointment. No ocular signs that would be consistent with symptoms. Symptoms of red spots in entire field of view (both eyes) for extended period likely neurological.  Recommend evaluation with neurology- will enter referral today.     Nitin Jacboson O.D.  [address] [place]  [address]    [phone]           Again, thank you for allowing me to participate in the care of your patient.        Sincerely,        Nitin Jacobson, OD

## 2019-12-09 NOTE — PATIENT INSTRUCTIONS
Ocular migraines are painless, temporary visual disturbances that can affect one or both eyes. Though they can be frightening, ocular migraines typically are harmless.  Some people may find that taking a pain reliever such as tylenol or ibuprofen may lessen the length of the visual changes.  Ocular or eye migraines can be triggered by certain foods, caffeinated drinks, red wine, smoked meats and chocolate.  Food additives such as MSG and artificial sweeteners may also trigger the migraines in some people.  A person should seek immediate medical attention in cases such as:  A noticeable disruption of any of the five basic senses (seeing, hearing, smelling, tasting, touching).   A sudden change in mental status, such as unusual memory loss or inability to recognize a familiar face.   Difficulty in ambulating, which includes walking in a straight line or standing in place without falling.   Difficulty in communicating, such as slurred speech    Patient is no longer experiencing symptoms during the appointment. No ocular signs that would be consistent with symptoms. Symptoms of red spots in entire field of view (both eyes) for extended period likely neurological. Recommend evaluation with neurology- will enter referral today.     Nitin Jacobson O.D.  [address] [place]  [address]    [phone]

## 2019-12-10 ENCOUNTER — OFFICE VISIT (OUTPATIENT)
Dept: FAMILY MEDICINE | Facility: CLINIC | Age: 84
End: 2019-12-10
Payer: COMMERCIAL

## 2019-12-10 VITALS
DIASTOLIC BLOOD PRESSURE: 66 MMHG | SYSTOLIC BLOOD PRESSURE: 156 MMHG | BODY MASS INDEX: 33.24 KG/M2 | WEIGHT: 143 LBS | HEART RATE: 106 BPM | TEMPERATURE: 98.1 F

## 2019-12-10 DIAGNOSIS — Z23 NEED FOR PROPHYLACTIC VACCINATION AND INOCULATION AGAINST INFLUENZA: ICD-10-CM

## 2019-12-10 DIAGNOSIS — I10 HYPERTENSION GOAL BP (BLOOD PRESSURE) < 140/90: ICD-10-CM

## 2019-12-10 DIAGNOSIS — H53.9 VISION CHANGES: Primary | ICD-10-CM

## 2019-12-10 DIAGNOSIS — E11.9 TYPE 2 DIABETES MELLITUS WITHOUT COMPLICATION, WITHOUT LONG-TERM CURRENT USE OF INSULIN (H): ICD-10-CM

## 2019-12-10 DIAGNOSIS — R06.00 DYSPNEA, UNSPECIFIED TYPE: ICD-10-CM

## 2019-12-10 PROCEDURE — 90662 IIV NO PRSV INCREASED AG IM: CPT | Performed by: FAMILY MEDICINE

## 2019-12-10 PROCEDURE — 99214 OFFICE O/P EST MOD 30 MIN: CPT | Mod: 25 | Performed by: FAMILY MEDICINE

## 2019-12-10 PROCEDURE — G0008 ADMIN INFLUENZA VIRUS VAC: HCPCS | Performed by: FAMILY MEDICINE

## 2019-12-10 ASSESSMENT — PAIN SCALES - GENERAL: PAINLEVEL: NO PAIN (0)

## 2019-12-10 NOTE — PROGRESS NOTES
Subjective     David Ng is a 85 year old female who presents to clinic today for the following health issues:    HPI   Seeing red for the past 2 days        did see eye doctor; they said eyes are okay    Red spots on everything    Tiny red spots all over           Reviewed and updated as needed this visit by Provider         Review of Systems   ROS COMP:  Apparently eye doctor advised neurology appointment    No balance problems    No confusion  No headache    No fever chills    Not blurry vision     Both eyes affected    No change with body position    Standing up not making it worse    Other senses all okay              Objective    BP (!) 162/77 (BP Location: Right arm, Patient Position: Chair, Cuff Size: Adult Regular)   Pulse 106   Temp 98.1  F (36.7  C) (Oral)   Wt 64.9 kg (143 lb)   Breastfeeding No   BMI 33.24 kg/m    Body mass index is 33.24 kg/m .  Physical Exam  Constitutional:       Appearance: She is well-developed.   HENT:      Head: Normocephalic and atraumatic.   Eyes:      Conjunctiva/sclera: Conjunctivae normal.   Neck:      Vascular: No carotid bruit.   Cardiovascular:      Rate and Rhythm: Normal rate and regular rhythm.      Heart sounds: Normal heart sounds.   Pulmonary:      Effort: Pulmonary effort is normal. No respiratory distress.      Breath sounds: Normal breath sounds.   Neurological:      Mental Status: She is alert and oriented to person, place, and time.             Diagnostic Test Results:  Labs reviewed in Epic              ASSESSMENT / PLAN:  (H53.9) Vision changes  (primary encounter diagnosis)  Comment: patient saw eye doctor yest.  To see neurology tomorrow.  Of note I will do referral for [facility].   Plan: US Carotid Bilateral        Prudent to check ultrasound carotids    (R06.00) Dyspnea, unspecified type  Comment: no heart testing recently.  Echo is good idea.   Plan: Echocardiogram Complete             (E11.9) Type 2 diabetes mellitus without complication,  without long-term current use of insulin (H)  Comment: last hemoglobin a1c okay  Plan: no change     (I10) Hypertension goal BP (blood pressure) < 140/90  Comment: this is tricky.  Patient on 4 meds ( in 3 pills).  She has no symptoms that are clearly related to the high blood pressure, and it is only today that her blood pressure has not been at least reasonable. Don't want to cause new symptoms especially when then there is some concern about perfusion as a contriubutor to symptoms.   Plan: monitor.  See us back in a month. Check some readings out of clinic     (Z23) Need for prophylactic vaccination and inoculation against influenza  Comment: needs   Plan: INFLUENZA (HIGH DOSE) 3 VALENT VACCINE [85099],        ADMIN INFLUENZA (For MEDICARE Patients ONLY)         []        Given    Be seen promptly if symptoms acutely worsen         I reviewed the patient's medications, allergies, medical history, family history, and social history.    Jayden Munoz MD

## 2019-12-10 NOTE — PATIENT INSTRUCTIONS
Check ultrasound of heart and the neck arteries    Check blood pressure occasionally     See neurology as planned    Be seen promptly if symptoms acutely worsen

## 2019-12-11 ENCOUNTER — TELEPHONE (OUTPATIENT)
Dept: FAMILY MEDICINE | Facility: CLINIC | Age: 84
End: 2019-12-11

## 2019-12-11 ENCOUNTER — TRANSFERRED RECORDS (OUTPATIENT)
Dept: HEALTH INFORMATION MANAGEMENT | Facility: CLINIC | Age: 84
End: 2019-12-11

## 2019-12-11 DIAGNOSIS — H54.7 VISION PROBLEM: Primary | ICD-10-CM

## 2019-12-11 NOTE — TELEPHONE ENCOUNTER
Attempt #   Called patient at home number.  Was call answered?  Yes, son answered no consent to communicate in Epic, nurse did not give son any information just gathered information from son, at [facility] right now and wondering if should see the vitriol retinal macral degeneration surgeon in the same building as [facility] . Seems to be a deposit in her eye that is making her see red.     Patient requesting referral   US scheduled for Friday.        Brittany Moody RN  [facility]

## 2019-12-11 NOTE — TELEPHONE ENCOUNTER
Reason for Call:  Other call back    Detailed comments: Pt would like a call back to discuss  appt form yesterday.    Phone Number Patient can be reached at: Other phone number:  [phone]    Best Time: Anytime    Can we leave a detailed message on this number? NO    Call taken on 12/11/2019 at 10:18 AM by Niki Gatica

## 2019-12-11 NOTE — TELEPHONE ENCOUNTER
Attempt #   Called patient at home number.  Was call answered?  Yes, relayed message to son who states will walk into the office because they are still there at the [facility] across the barry from the [facility].        Brittany Moody RN  [facility]

## 2019-12-11 NOTE — TELEPHONE ENCOUNTER
Attempt #   Called patient at home [phone]  Was call answered?  No answer, left message to call nurse line at [phone]  Above number is for Elbert Ana Mariasasha  NO consent to communicate in Epic.            Brittany Moody RN  [facility]

## 2019-12-11 NOTE — TELEPHONE ENCOUNTER
Yes agree with seeing the retina specialist  I did referral for this group  Please give them the number to call and schedule consult  Jayden Munoz MD

## 2019-12-11 NOTE — TELEPHONE ENCOUNTER
Patient son returned call to nurse triage line. He said they are looking for a referral to a specialist for the red spots the patient is seeing. Requested a call back to discuss if needed.     Latonya Solares RN

## 2019-12-12 ENCOUNTER — ANCILLARY PROCEDURE (OUTPATIENT)
Dept: ULTRASOUND IMAGING | Facility: CLINIC | Age: 84
End: 2019-12-12
Attending: FAMILY MEDICINE
Payer: COMMERCIAL

## 2019-12-12 DIAGNOSIS — H53.9 VISION CHANGES: ICD-10-CM

## 2019-12-12 PROCEDURE — 93880 EXTRACRANIAL BILAT STUDY: CPT

## 2019-12-13 ENCOUNTER — TELEPHONE (OUTPATIENT)
Dept: FAMILY MEDICINE | Facility: CLINIC | Age: 84
End: 2019-12-13

## 2019-12-13 ENCOUNTER — TRANSFERRED RECORDS (OUTPATIENT)
Dept: HEALTH INFORMATION MANAGEMENT | Facility: CLINIC | Age: 84
End: 2019-12-13

## 2019-12-13 DIAGNOSIS — Z78.0 POSTMENOPAUSAL STATUS: Primary | ICD-10-CM

## 2019-12-13 NOTE — TELEPHONE ENCOUNTER
Patient sent a MyChart Appointment request and would like to complete a Dexa Scan. There is currently no order. Patient also would like her Carotid US results and wants to know where she can find them. TC sees that PCP resulted the US but is unable to find where those results went to. It does not appear that they were sent to patient via [facility] and there has been no result letter mailed.     Routing to address orders request and results. Please notify patient when Dexa order has been placed.

## 2019-12-13 NOTE — TELEPHONE ENCOUNTER
Copy pasted result US note to my chart message.         Routing to PCP to enter Dexa scan order/referral.      Brittany Moody RN  [facility]

## 2019-12-13 NOTE — TELEPHONE ENCOUNTER
I put in dexa order    Please help schedule this    Please inform patient that the neck arteries only showed mild narrowing; not worrisome    Thanks    Jayden Munoz MD

## 2019-12-16 ENCOUNTER — ANCILLARY PROCEDURE (OUTPATIENT)
Dept: BONE DENSITY | Facility: CLINIC | Age: 84
End: 2019-12-16
Payer: COMMERCIAL

## 2019-12-16 DIAGNOSIS — Z78.0 POSTMENOPAUSAL STATUS: ICD-10-CM

## 2019-12-16 PROCEDURE — 77080 DXA BONE DENSITY AXIAL: CPT | Performed by: INTERNAL MEDICINE

## 2019-12-17 ENCOUNTER — TRANSFERRED RECORDS (OUTPATIENT)
Dept: HEALTH INFORMATION MANAGEMENT | Facility: CLINIC | Age: 84
End: 2019-12-17

## 2019-12-18 ENCOUNTER — ALLIED HEALTH/NURSE VISIT (OUTPATIENT)
Dept: NURSING | Facility: CLINIC | Age: 84
End: 2019-12-18
Payer: COMMERCIAL

## 2019-12-18 ENCOUNTER — TELEPHONE (OUTPATIENT)
Dept: FAMILY MEDICINE | Facility: CLINIC | Age: 84
End: 2019-12-18

## 2019-12-18 DIAGNOSIS — H47.011 ANTERIOR ISCHEMIC OPTIC NEUROPATHY OF RIGHT EYE: Primary | ICD-10-CM

## 2019-12-18 DIAGNOSIS — Z23 NEED FOR SHINGLES VACCINE: Primary | ICD-10-CM

## 2019-12-18 DIAGNOSIS — H47.011 ANTERIOR ISCHEMIC OPTIC NEUROPATHY OF RIGHT EYE: ICD-10-CM

## 2019-12-18 LAB
CRP SERPL-MCNC: 3.2 MG/L (ref 0–8)
ERYTHROCYTE [DISTWIDTH] IN BLOOD BY AUTOMATED COUNT: 15.8 % (ref 10–15)
ERYTHROCYTE [SEDIMENTATION RATE] IN BLOOD BY WESTERGREN METHOD: 37 MM/H (ref 0–30)
HCT VFR BLD AUTO: 38.1 % (ref 35–47)
HGB BLD-MCNC: 12.5 G/DL (ref 11.7–15.7)
MCH RBC QN AUTO: 21.5 PG (ref 26.5–33)
MCHC RBC AUTO-ENTMCNC: 32.8 G/DL (ref 31.5–36.5)
MCV RBC AUTO: 66 FL (ref 78–100)
PLATELET # BLD AUTO: 323 10E9/L (ref 150–450)
RBC # BLD AUTO: 5.81 10E12/L (ref 3.8–5.2)
WBC # BLD AUTO: 8 10E9/L (ref 4–11)

## 2019-12-18 PROCEDURE — 99207 ZZC NO CHARGE NURSE ONLY: CPT

## 2019-12-18 PROCEDURE — 90471 IMMUNIZATION ADMIN: CPT

## 2019-12-18 PROCEDURE — 85652 RBC SED RATE AUTOMATED: CPT | Performed by: OPHTHALMOLOGY

## 2019-12-18 PROCEDURE — 86140 C-REACTIVE PROTEIN: CPT | Performed by: OPHTHALMOLOGY

## 2019-12-18 PROCEDURE — 85027 COMPLETE CBC AUTOMATED: CPT | Performed by: OPHTHALMOLOGY

## 2019-12-18 PROCEDURE — 36415 COLL VENOUS BLD VENIPUNCTURE: CPT | Performed by: OPHTHALMOLOGY

## 2019-12-18 NOTE — NURSING NOTE
Prior to immunization administration, verified patients identity using patient s name and date of birth. Please see Immunization Activity for additional information.     Screening Questionnaire for Adult Immunization    Are you sick today?   No   Do you have allergies to medications, food, a vaccine component or latex?   Yes   Have you ever had a serious reaction after receiving a vaccination?   No   Do you have a long-term health problem with heart, lung, kidney, or metabolic disease (e.g., diabetes), asthma, a blood disorder, no spleen, complement component deficiency, a cochlear implant, or a spinal fluid leak?  Are you on long-term aspirin therapy?   No   Do you have cancer, leukemia, HIV/AIDS, or any other immune system problem?   No   Do you have a parent, brother, or sister with an immune system problem?   No   In the past 3 months, have you taken medications that affect  your immune system, such as prednisone, other steroids, or anticancer drugs; drugs for the treatment of rheumatoid arthritis, Crohn s disease, or psoriasis; or have you had radiation treatments?   No   Have you had a seizure, or a brain or other nervous system problem?   No   During the past year, have you received a transfusion of blood or blood    products, or been given immune (gamma) globulin or antiviral drug?   No   For women: Are you pregnant or is there a chance you could become       pregnant during the next month?   No   Have you received any vaccinations in the past 4 weeks?   No     Immunization questionnaire was positive for at least one answer.  Notified Dr. Munoz.        Per orders of Dr. Munoz, injection of Shingrix Vaccine given by Reyna Pelaez MA. Patient instructed to remain in clinic for 15 minutes afterwards, and to report any adverse reaction to me immediately.     Screening performed by Reyna Pelaez MA on 12/18/2019 at 10:54 AM.  VIS for Shingrix vaccines given on same date of administration.  Staff  signature/Title: Reyna Pelaez MA on 12/18/2019 at 10:55 AM

## 2019-12-19 NOTE — RESULT ENCOUNTER NOTE
Here is the bone density test result.   You do have thin bones.  Make sure you get at least one calcium + vitamin D pill 2x daily.  We could also consider starting a prescription medication to help the bones, but there are potential side effects on the prescription medication.    If you are interested in this, see us in clinic in the next few weeks.    Jayden Munoz MD

## 2019-12-20 ENCOUNTER — ANCILLARY PROCEDURE (OUTPATIENT)
Dept: CARDIOLOGY | Facility: CLINIC | Age: 84
End: 2019-12-20
Attending: FAMILY MEDICINE
Payer: COMMERCIAL

## 2019-12-20 DIAGNOSIS — R06.00 DYSPNEA, UNSPECIFIED TYPE: ICD-10-CM

## 2019-12-20 PROCEDURE — 93306 TTE W/DOPPLER COMPLETE: CPT | Performed by: INTERNAL MEDICINE

## 2019-12-22 NOTE — RESULT ENCOUNTER NOTE
The wall of the heart muscle is a little thickened but but this is often seen in people with high blood pressure    Heart still squeezes diane Munoz MD

## 2019-12-26 ENCOUNTER — OFFICE VISIT (OUTPATIENT)
Dept: FAMILY MEDICINE | Facility: CLINIC | Age: 84
End: 2019-12-26
Payer: COMMERCIAL

## 2019-12-26 VITALS
DIASTOLIC BLOOD PRESSURE: 80 MMHG | BODY MASS INDEX: 33.24 KG/M2 | WEIGHT: 143 LBS | HEART RATE: 100 BPM | TEMPERATURE: 98.2 F | SYSTOLIC BLOOD PRESSURE: 155 MMHG

## 2019-12-26 DIAGNOSIS — R53.83 FATIGUE, UNSPECIFIED TYPE: ICD-10-CM

## 2019-12-26 DIAGNOSIS — I10 HYPERTENSION GOAL BP (BLOOD PRESSURE) < 140/90: Primary | ICD-10-CM

## 2019-12-26 DIAGNOSIS — E11.9 TYPE 2 DIABETES MELLITUS WITHOUT COMPLICATION, WITHOUT LONG-TERM CURRENT USE OF INSULIN (H): ICD-10-CM

## 2019-12-26 DIAGNOSIS — I16.0 HYPERTENSIVE URGENCY: ICD-10-CM

## 2019-12-26 DIAGNOSIS — K21.9 GASTROESOPHAGEAL REFLUX DISEASE, ESOPHAGITIS PRESENCE NOT SPECIFIED: ICD-10-CM

## 2019-12-26 DIAGNOSIS — R06.00 DYSPNEA, UNSPECIFIED TYPE: ICD-10-CM

## 2019-12-26 DIAGNOSIS — I51.7 LVH (LEFT VENTRICULAR HYPERTROPHY): ICD-10-CM

## 2019-12-26 DIAGNOSIS — Z13.6 SCREENING FOR AAA (ABDOMINAL AORTIC ANEURYSM): ICD-10-CM

## 2019-12-26 LAB
ALBUMIN SERPL-MCNC: 3.6 G/DL (ref 3.4–5)
ALP SERPL-CCNC: 70 U/L (ref 40–150)
ALT SERPL W P-5'-P-CCNC: 28 U/L (ref 0–50)
ANION GAP SERPL CALCULATED.3IONS-SCNC: 12 MMOL/L (ref 3–14)
AST SERPL W P-5'-P-CCNC: 25 U/L (ref 0–45)
BASOPHILS # BLD AUTO: 0 10E9/L (ref 0–0.2)
BASOPHILS NFR BLD AUTO: 0.3 %
BILIRUB SERPL-MCNC: 0.3 MG/DL (ref 0.2–1.3)
BUN SERPL-MCNC: 18 MG/DL (ref 7–30)
CALCIUM SERPL-MCNC: 9.8 MG/DL (ref 8.5–10.1)
CHLORIDE SERPL-SCNC: 106 MMOL/L (ref 94–109)
CO2 SERPL-SCNC: 23 MMOL/L (ref 20–32)
CREAT SERPL-MCNC: 0.61 MG/DL (ref 0.52–1.04)
DIFFERENTIAL METHOD BLD: ABNORMAL
EOSINOPHIL # BLD AUTO: 0 10E9/L (ref 0–0.7)
EOSINOPHIL NFR BLD AUTO: 0.3 %
ERYTHROCYTE [DISTWIDTH] IN BLOOD BY AUTOMATED COUNT: 16.5 % (ref 10–15)
GFR SERPL CREATININE-BSD FRML MDRD: 82 ML/MIN/{1.73_M2}
GLUCOSE SERPL-MCNC: 174 MG/DL (ref 70–99)
HBA1C MFR BLD: 6.6 % (ref 0–5.6)
HCT VFR BLD AUTO: 38.6 % (ref 35–47)
HGB BLD-MCNC: 12.5 G/DL (ref 11.7–15.7)
LYMPHOCYTES # BLD AUTO: 2.4 10E9/L (ref 0.8–5.3)
LYMPHOCYTES NFR BLD AUTO: 27.5 %
MCH RBC QN AUTO: 21.2 PG (ref 26.5–33)
MCHC RBC AUTO-ENTMCNC: 32.4 G/DL (ref 31.5–36.5)
MCV RBC AUTO: 66 FL (ref 78–100)
MONOCYTES # BLD AUTO: 0.5 10E9/L (ref 0–1.3)
MONOCYTES NFR BLD AUTO: 6.3 %
NEUTROPHILS # BLD AUTO: 5.7 10E9/L (ref 1.6–8.3)
NEUTROPHILS NFR BLD AUTO: 65.6 %
NT-PROBNP SERPL-MCNC: 49 PG/ML (ref 0–450)
PLATELET # BLD AUTO: 355 10E9/L (ref 150–450)
PLATELET # BLD EST: ABNORMAL 10*3/UL
POTASSIUM SERPL-SCNC: 3.8 MMOL/L (ref 3.4–5.3)
PROT SERPL-MCNC: 8.3 G/DL (ref 6.8–8.8)
RBC # BLD AUTO: 5.89 10E12/L (ref 3.8–5.2)
RBC MORPH BLD: NORMAL
SODIUM SERPL-SCNC: 141 MMOL/L (ref 133–144)
TSH SERPL DL<=0.005 MIU/L-ACNC: 2.58 MU/L (ref 0.4–4)
WBC # BLD AUTO: 8.7 10E9/L (ref 4–11)

## 2019-12-26 PROCEDURE — 36415 COLL VENOUS BLD VENIPUNCTURE: CPT | Performed by: FAMILY MEDICINE

## 2019-12-26 PROCEDURE — 80053 COMPREHEN METABOLIC PANEL: CPT | Performed by: FAMILY MEDICINE

## 2019-12-26 PROCEDURE — 83036 HEMOGLOBIN GLYCOSYLATED A1C: CPT | Performed by: FAMILY MEDICINE

## 2019-12-26 PROCEDURE — 84443 ASSAY THYROID STIM HORMONE: CPT | Performed by: FAMILY MEDICINE

## 2019-12-26 PROCEDURE — 85025 COMPLETE CBC W/AUTO DIFF WBC: CPT | Performed by: FAMILY MEDICINE

## 2019-12-26 PROCEDURE — 99214 OFFICE O/P EST MOD 30 MIN: CPT | Performed by: FAMILY MEDICINE

## 2019-12-26 PROCEDURE — 83880 ASSAY OF NATRIURETIC PEPTIDE: CPT | Performed by: FAMILY MEDICINE

## 2019-12-26 RX ORDER — AMLODIPINE BESYLATE 10 MG/1
10 TABLET ORAL DAILY
Qty: 90 TABLET | Refills: 0 | Status: SHIPPED | OUTPATIENT
Start: 2019-12-26 | End: 2020-05-06

## 2019-12-26 RX ORDER — FAMOTIDINE 20 MG/1
20 TABLET, FILM COATED ORAL 2 TIMES DAILY
Qty: 60 TABLET | Refills: 5 | Status: SHIPPED | OUTPATIENT
Start: 2019-12-26 | End: 2020-09-18

## 2019-12-26 NOTE — TELEPHONE ENCOUNTER
RECORDS RECEIVED FROM:  Appt notes Hypertension goal BP (blood pressure) < 140/90, Fatigue unspecified type, LVH (left ventricular hypertrophy), per Pt, referred by Dr. Munoz, referral and records in [facility]   DATE RECEIVED:12.28.19   NOTES STATUS DETAILS   OFFICE NOTE from referring provider    Internal 12.26.19 Dr. Munoz   OFFICE NOTE from other cardiologist    N/A    DISCHARGE SUMMARY from hospital    N/A    DISCHARGE REPORT from the ER   N/A    OPERATIVE REPORT    N/A    MEDICATION LIST   Internal    LABS     BMP   N/A    CBC   Internal 12.18.19   CMP   Internal 3.20.19   Lipids   Internal 3.20.19   TSH   In process 3.20.19   DIAGNOSTIC PROCEDURES     EKG   N/A    Monitor Reports   N/A    IMAGING (DISC & REPORT)      Echo   Internal 12.10.19   Stress Tests   Internal 3.14.12   Cath   N/A    MRI/MRA   N/A    CT/CTA   N/A

## 2019-12-26 NOTE — PROGRESS NOTES
Subjective     David Ng is a 85 year old female who presents to clinic today for the following health issues:    HPI   Feels like her heart has been racing for the past week               Reviewed and updated as needed this visit by Provider         Review of Systems   ROS COMP: overall feeling worse    Heart racing faster    Feels a tightness in upper abd    There most of time    Not chest pain    No aggrevating/ alleviating factors    Not sleeping well          Objective    BP (!) 168/81 (BP Location: Left arm, Patient Position: Chair, Cuff Size: Adult Regular)   Pulse 100   Temp 98.2  F (36.8  C) (Oral)   Wt 64.9 kg (143 lb)   Breastfeeding No   BMI 33.24 kg/m    Body mass index is 33.24 kg/m .  Physical Exam  Constitutional:       Appearance: She is well-developed.   HENT:      Head: Normocephalic and atraumatic.   Eyes:      Conjunctiva/sclera: Conjunctivae normal.   Neck:      Vascular: No carotid bruit.   Cardiovascular:      Rate and Rhythm: Normal rate and regular rhythm.      Heart sounds: Normal heart sounds.   Pulmonary:      Effort: Pulmonary effort is normal. No respiratory distress.      Breath sounds: Normal breath sounds.   Chest:      Chest wall: No tenderness.   Abdominal:      General: There is distension.      Palpations: Abdomen is soft.      Tenderness: There is abdominal tenderness. There is guarding. There is no right CVA tenderness or left CVA tenderness.   Neurological:      Mental Status: She is alert and oriented to person, place, and time.               Diagnostic Test Results:  Labs reviewed in Epic               ASSESSMENT / PLAN:  (I10) Hypertension goal BP (blood pressure) < 140/90  (primary encounter diagnosis)  Comment: difficulty to control blood pressure despite 4 meds ( 3 pills ).    Plan: amLODIPine (NORVASC) 10 MG tablet, CARDIOLOGY         EVAL ADULT REFERRAL        Increase  Amlodipine to 10 mg daily.  Warned of side effects.  Continue other meds as is.  See  cardiology.     (E11.9) Type 2 diabetes mellitus without complication, without long-term current use of insulin (H)  Comment: check lab.  Plan: Hemoglobin A1c             (R53.83) Fatigue, unspecified type  Comment: check labs.  Fatigue  May be  multifactorial  Plan: Comprehensive metabolic panel, CBC with         platelets differential, TSH with free T4         reflex, BNP-N terminal pro, CARDIOLOGY EVAL         ADULT REFERRAL             (R06.00) Dyspnea, unspecified type   Comment: check this to rule out  chf   Plan: BNP-N terminal pro             (I51.7) LVH (left ventricular hypertrophy)  Comment: went over  Echo results in detail   Plan: CARDIOLOGY EVAL ADULT REFERRAL        Patient to see cardiology     (Z13.6) Screening for AAA (abdominal aortic aneurysm)  Comment: aorta a bit  Large on echo  Plan: with her  Upper abd symptoms which are quite vague prudent to make sure no AAA present    (I16.0) Hypertensive urgency   Comment: as above  Plan: US Abdominal Aorta Imaging             (K21.9) Gastroesophageal reflux disease, esophagitis presence not specified  Comment: trial of h2 blocker   Plan: famotidine (PEPCID) 20 MG tablet               I reviewed the patient's medications, allergies, medical history, family history, and social history.    Jayden Munoz MD

## 2019-12-26 NOTE — PATIENT INSTRUCTIONS
Increase amlodipine to 10 mg daily    Keep other blood pressure meds as is    Healthy low salt diet    Stay  Well hydrated    Schedule with cardiology     See neurology as planned    Try to stay active/ walking as able    Ultrasound of abdominal aorta    Try over the counter pepcid ( famotidine ) for the heartburn

## 2019-12-27 ENCOUNTER — TRANSFERRED RECORDS (OUTPATIENT)
Dept: HEALTH INFORMATION MANAGEMENT | Facility: CLINIC | Age: 84
End: 2019-12-27

## 2019-12-27 NOTE — RESULT ENCOUNTER NOTE
The overall diabetes test ( hemoglobin a1c ) is fine.    Other labs are good.    See cardiology as advised.    Jayden Munoz MD

## 2019-12-28 ENCOUNTER — ANCILLARY PROCEDURE (OUTPATIENT)
Dept: CARDIOLOGY | Facility: CLINIC | Age: 84
End: 2019-12-28
Attending: INTERNAL MEDICINE
Payer: COMMERCIAL

## 2019-12-28 ENCOUNTER — PRE VISIT (OUTPATIENT)
Dept: CARDIOLOGY | Facility: CLINIC | Age: 84
End: 2019-12-28

## 2019-12-28 VITALS
OXYGEN SATURATION: 96 % | BODY MASS INDEX: 27.47 KG/M2 | WEIGHT: 139.9 LBS | DIASTOLIC BLOOD PRESSURE: 82 MMHG | HEART RATE: 88 BPM | HEIGHT: 60 IN | SYSTOLIC BLOOD PRESSURE: 137 MMHG

## 2019-12-28 DIAGNOSIS — R06.09 DOE (DYSPNEA ON EXERTION): Primary | ICD-10-CM

## 2019-12-28 DIAGNOSIS — E78.5 DYSLIPIDEMIA: ICD-10-CM

## 2019-12-28 DIAGNOSIS — R00.2 PALPITATIONS: ICD-10-CM

## 2019-12-28 DIAGNOSIS — I10 HYPERTENSION GOAL BP (BLOOD PRESSURE) < 140/90: ICD-10-CM

## 2019-12-28 DIAGNOSIS — R06.09 DOE (DYSPNEA ON EXERTION): ICD-10-CM

## 2019-12-28 PROCEDURE — 0296T ZIO PATCH HOLTER ADULT PEDIATRIC GREATER THAN 48 HRS: CPT | Mod: ZF

## 2019-12-28 PROCEDURE — 99204 OFFICE O/P NEW MOD 45 MIN: CPT | Mod: ZP | Performed by: INTERNAL MEDICINE

## 2019-12-28 PROCEDURE — 93005 ELECTROCARDIOGRAM TRACING: CPT | Mod: ZF

## 2019-12-28 PROCEDURE — G0463 HOSPITAL OUTPT CLINIC VISIT: HCPCS | Mod: 25,ZF

## 2019-12-28 PROCEDURE — 0298T ZIO PATCH HOLTER ADULT PEDIATRIC GREATER THAN 48 HRS: CPT | Mod: ZP | Performed by: INTERNAL MEDICINE

## 2019-12-28 ASSESSMENT — MIFFLIN-ST. JEOR: SCORE: 1001.08

## 2019-12-28 ASSESSMENT — PAIN SCALES - GENERAL: PAINLEVEL: NO PAIN (0)

## 2019-12-28 NOTE — NURSING NOTE
Chief Complaint   Patient presents with     New Patient      New, 86 yo female, HTN, HLD     Vitals were taken and medications were reconciled. EKG was performed.    Britta Gallegos,ELIZABETH  9:50 AM

## 2019-12-28 NOTE — PROGRESS NOTES
Per Dr. Mckay, patient to have 14 day ziopatch monitor placed.  Diagnosis: chest pressure  Monitor placed: Yes  Patient Instructed: Yes  Patient verbalized understanding: Yes  Holter # [id number]    ELIZABETH Poe  12:10 PM

## 2019-12-28 NOTE — PATIENT INSTRUCTIONS
Patient Instructions:  It was a pleasure to see you in the cardiology clinic today.      If you have any questions, call  Margaret Giron RN, at [phone].  Press Option #1 for the [facility], and then press Option #4  We are encouraging the use of [employer] to communicate with your HealthCare Provider    Note the new or changes to your medications: None  Stop the following medications: None    The results from today include: None  Please follow up with Dr. Mckay in 4 weeks, Have a stress test, Zio patch today.       If you have an urgent need after hours (8:00 am to 4:30 pm) please call [phone] and ask for the cardiology fellow on call.

## 2019-12-28 NOTE — LETTER
12/28/2019      RE: David Ng  [address] [facility] [facility] [id number]       Dear Colleague,    Thank you for the opportunity to participate in the care of your patient, David Ng, at the [facility]. Please see a copy of my visit note below.    HPI:   David Ng is a 85 year old woman with hypertension, dyslipidemia, diet controlled diabetes. She has no known heart disease. She is here for evaluation of exertional dyspnea.   She is here with her son and reports several month history of exertional dyspnea now noticeable with minimal exertion. She also notes palpitations which she describes as a rapid heart beats that last for a few minutes.  This is associated with tightness in the chest under her left breast.  All her symptoms are self limited.  She was recently seen by neurologist for visual changes and has been seeing red spots in her visual field and has undergone several investigations including MRI of the brain and an EEG.  She denies any prior strokes or TIAs.  She is awaiting a follow-up with her neurologist.  She was also noted to be hypertensive and her amlodipine dose was increased to 10 mg daily.  Denies any sudden death history in family. Patient denies orthopnea, paroxysmal nocturnal dyspnea, presyncope, syncope, edema or claudication. She does not smoke.     PAST MEDICAL HISTORY:  Past Medical History:   Diagnosis Date     Abnormal mammogram 3-96     Advanced directives, counseling/discussion 9/2/2011     Cough 7/25/2011     CTS (carpal tunnel syndrome) 3/13/2012     DJD (degenerative joint disease)      DM (diabetes mellitus), type 2, uncontrolled (H) 4/15/2010     HTN (hypertension) 4/15/2010     Hypercholesteremia 4/15/2010     Hyperlipidemia LDL goal <100 10/31/2010     Hypertension goal BP (blood pressure) < 140/90 7/24/2011     LBP (low back pain) 9/9/2011     Osteoporosis, post-menopausal 7/26/2011     Thoracic  back pain 11/12/2012     Type 2 diabetes, HbA1C goal < 8% (H) 7/24/2011       CURRENT MEDICATIONS:  Current Outpatient Medications   Medication Sig Dispense Refill     amLODIPine (NORVASC) 10 MG tablet Take 1 tablet (10 mg) by mouth daily 90 tablet 0     aspirin 325 MG EC tablet Take 1-2 tablets by mouth daily as needed.       Calcium Carbonate-Vitamin D (CALCIUM + D) 600-200 MG-UNIT TABS Take 2 tablets by mouth daily. 200 tablet 4     cholecalciferol (VITAMIN D) 400 UNIT TABS Take 1 tablet by mouth daily. 100 tablet 4     gabapentin (NEURONTIN) 100 MG capsule TAKE 1 CAPSULE AT BEDTIME 90 capsule 3     GLUCOSAMINE 500 MG OR TABS 1 TABLET DAILY       losartan-hydrochlorothiazide (HYZAAR) 100-25 MG tablet TAKE 1 TABLET ONE TIME DAILY (NEEDS TO BE SEEN) 90 tablet 1     melatonin 3 MG tablet 1-2 po at bedtime as needed for sleep 60 tablet 5     metFORMIN (GLUCOPHAGE-XR) 750 MG 24 hr tablet TAKE 2 TABLETS (1,500 MG) DAILY (WITH DINNER) 180 tablet 1     metoprolol succinate ER (TOPROL-XL) 200 MG 24 hr tablet TAKE 1 TABLET EVERY DAY 90 tablet 1     Naproxen Sodium (ALEVE PO)        pravastatin (PRAVACHOL) 40 MG tablet TAKE 1 TABLET EVERY DAY 90 tablet 3     acetaminophen (TYLENOL) 500 MG tablet Take 2 tablets by mouth 2 times daily. (Patient not taking: Reported on 12/28/2019) 100 tablet 11     cyclobenzaprine (FLEXERIL) 5 MG tablet Take 1 tablet (5 mg) by mouth 3 times daily as needed for muscle spasms (Patient not taking: Reported on 12/28/2019) 30 tablet 1     famotidine (PEPCID) 20 MG tablet Take 1 tablet (20 mg) by mouth 2 times daily (Patient not taking: Reported on 12/28/2019) 60 tablet 5       PAST SURGICAL HISTORY:  Past Surgical History:   Procedure Laterality Date     D & C       HC FEMUR/KNEE SURG UNLISTED  8-    knee scope bilaterally     HEMORRHOIDECTOMY       ROTATOR CUFF REPAIR RT/LT      LT rotator cuff tear / Biceps tendod tear / glenoid labrum tear / Acromioclavicular arthritis      VERTEBROPLASTY  2012    T12       ALLERGIES     Allergies   Allergen Reactions     Lisinopril      cough       FAMILY HISTORY:  Family History   Problem Relation Age of Onset     Cerebrovascular Disease Mother 70     Gastrointestinal Disease Father         liver problems     Heart Disease Brother 58         68 MI     Glaucoma No family hx of      Macular Degeneration No family hx of        SOCIAL HISTORY:  Social History     Socioeconomic History     Marital status:      Spouse name: None     Number of children: 2     Years of education: None     Highest education level: None   Occupational History     Employer: RETIRED   Social Needs     Financial resource strain: None     Food insecurity:     Worry: None     Inability: None     Transportation needs:     Medical: None     Non-medical: None   Tobacco Use     Smoking status: Never Smoker     Smokeless tobacco: Never Used     Tobacco comment: No second hand exposure.   Substance and Sexual Activity     Alcohol use: No     Alcohol/week: 0.0 standard drinks     Drug use: No     Sexual activity: Not Currently     Partners: Male   Lifestyle     Physical activity:     Days per week: None     Minutes per session: None     Stress: None   Relationships     Social connections:     Talks on phone: None     Gets together: None     Attends Hindu service: None     Active member of club or organization: None     Attends meetings of clubs or organizations: None     Relationship status: None     Intimate partner violence:     Fear of current or ex partner: None     Emotionally abused: None     Physically abused: None     Forced sexual activity: None   Other Topics Concern     Parent/sibling w/ CABG, MI or angioplasty before 65F 55M? No      Service No     Blood Transfusions No     Caffeine Concern No     Comment: 1 cup tea daily     Occupational Exposure No     Hobby Hazards No     Sleep Concern No     Stress Concern No     Weight Concern No     Special  Diet No     Back Care No     Exercise Yes     Comment: tries to walk 15 minutes daily.     Bike Helmet Not Asked     Seat Belt Yes     Self-Exams Not Asked   Social History Narrative    Lives in home alone.  1 son in [place] (Elbert), 1 in [place], [place] (Sylvain;  He has 3 kids);.  Retired.       ROS:   Constitutional: No fever, chills, or sweats. No weight gain/loss   ENT: No visual disturbance, ear ache, epistaxis, sore throat  Allergies/Immunologic: Negative.   Respiratory: No cough, hemoptysia  Cardiovascular: As per HPI  GI: No nausea, vomiting, hematemesis, melena, or hematochezia  : No urinary frequency, dysuria, or hematuria  Integument: Negative  Psychiatric: Negative  Neuro: Negative  Endocrinology: Negative   Musculoskeletal: Negative    EXAM:  /82 (BP Location: Right arm, Patient Position: Chair, Cuff Size: Adult Regular)   Pulse 88   Ht 1.524 m (5')   Wt 63.5 kg (139 lb 14.4 oz)   SpO2 96%   BMI 27.32 kg/m     In general, the patient is a pleasant female in no apparent distress.      HEENT: NC/AT.  PERRLA.  EOMI.  Sclerae white, not injected.    Neck: Carotids 2+ bilaterally without bruits.  No jugular venous distension.   Lymph: No cervical adenopathy. No thyromegaly.   Heart: RRR. Normal S1, S2. No murmur, rub, click, or gallop. There is no heave.    Lungs: Clear bilaterally.  No rhonchi, wheezes, rales.   GI: Soft, nontender, nondistended.   Extremities: No edema.  The pulses are 2+at the radial and DP bilaterally.  Neuro: grossly non focal.   Skin: no rashes.  Endocrine: no thyromegaly  Musculoskeletal: no joint swelling or tenderness, gait normal.  Psych: pleasant and conversant      Labs:  LIPID RESULTS:  Lab Results   Component Value Date    CHOL 221 (H) 03/20/2019    HDL 46 (L) 03/20/2019     (H) 03/20/2019    TRIG 223 (H) 03/20/2019    CHOLHDLRATIO 3.8 11/02/2015    NHDL 175 (H) 03/20/2019       LIVER ENZYME RESULTS:  Lab Results   Component Value Date    AST 25  12/26/2019    ALT 28 12/26/2019       CBC RESULTS:  Lab Results   Component Value Date    WBC 8.7 12/26/2019    RBC 5.89 (H) 12/26/2019    HGB 12.5 12/26/2019    HCT 38.6 12/26/2019    MCV 66 (L) 12/26/2019    MCH 21.2 (L) 12/26/2019    MCHC 32.4 12/26/2019    RDW 16.5 (H) 12/26/2019     12/26/2019       BMP RESULTS:  Lab Results   Component Value Date     12/26/2019    POTASSIUM 3.8 12/26/2019    CHLORIDE 106 12/26/2019    CO2 23 12/26/2019    ANIONGAP 12 12/26/2019     (H) 12/26/2019    BUN 18 12/26/2019    CR 0.61 12/26/2019    GFRESTIMATED 82 12/26/2019    GFRESTBLACK >90 12/26/2019    SUMEET 9.8 12/26/2019        A1C RESULTS:  Lab Results   Component Value Date    A1C 6.6 (H) 12/26/2019       INR RESULTS:  Lab Results   Component Value Date    INR 1.07 12/27/2012       Cardiac data:    ECG 12/2819 NSR non specific ST-T changes        Echocardiogram 12/201/9     Global and regional left ventricular function is hyperkinetic with an EF of 65-70%.  Mild concentric wall thickening consistent with left ventricular hypertrophy is present.Diastolic function not assessed due to significant mitral annular calcification.  Global right ventricular function is normal.There is mild to moderate right ventricular hypertrophy.  No pericardial effusion is present.  Mild dilatation of the aorta is present.  IVC diameter <2.1 cm collapsing >50% with sniff suggests a normal RA pressure of 3 mmHg.  Consider infiltrative CM such as amyloid.     No significant change from prior study.    Carotid US 12/12/19  1. Less than 50% diameter stenosis of the right ICA relative to the distal ICA diameter.   2. Less than 50% diameter stenosis of the left ICA relative to the distal ICA diameter.      Assessment and Plan:     David Ng is a 85 year old year old female with   Palpitations  Exertional dyspnea  Normal biventricular function  Aortic sclerosis  Mitral annular calcification  Essential hypertension  Mixed  dyslipidemia  Adult onset diabetes A1c level 6.6%      Patient is without angina or heart failure symptoms. She is euvolemic on exam, blood pressure and heart rate are in normal range. ECG shows sinus rhythm. Recent labs reviewed, LDL-c 130 mg/dL, triglycerides 223, hemoglobin, renal and liver function within normal limits.  We reviewed the findings from the Echo done in earlier this month which was notable for normal left and right ventricular size and function with mitral annular calcification and aortic sclerosis but no significant impairment of the function of the mitral valve with aortic valves.   The results of the carotid ultrasound from Dec 2019 were shared with the patient. It is reassuring that there is non obstructive disease.     I will arrange for a 14-day Zio patch to assess for paroxysmal atrial fibrillation given her symptoms of palpitations.  After the completion of this ambulatory ECG monitoring I will arrange for an MPI stress test to assess for inducible ischemia.  I have recommend no changes to the guideline directed medical therapy that she is on at this point. She is also on aspirin 325 mg/d. I have re-emphasized the importance of a low salt diet and daily exercise.     Follow-up:   Return to clinic in 4 weeks.  Patient's son was advised to contact us by MyChart or by calling the clinic if there are any urgent issues.    Angelita Mckay MD, MS  Staff Cardiologist  Pager: [phone]      CC  Patient Care Team:  Jayden Young MD as PCP - General (Family Practice)  Jayden Young MD as Assigned PCP  Charlotte Fenton RD as Diabetes Educator (Dietitian, Registered)  JAYDEN YOUNG

## 2019-12-28 NOTE — PROGRESS NOTES
HPI:   David Ng is a 85 year old woman with hypertension, dyslipidemia, diet controlled diabetes. She has no known heart disease. She is here for evaluation of exertional dyspnea.   She is here with her son and reports several month history of exertional dyspnea now noticeable with minimal exertion. She also notes palpitations which she describes as a rapid heart beats that last for a few minutes.  This is associated with tightness in the chest under her left breast.  All her symptoms are self limited.  She was recently seen by neurologist for visual changes and has been seeing red spots in her visual field and has undergone several investigations including MRI of the brain and an EEG.  She denies any prior strokes or TIAs.  She is awaiting a follow-up with her neurologist.  She was also noted to be hypertensive and her amlodipine dose was increased to 10 mg daily.  Denies any sudden death history in family. Patient denies orthopnea, paroxysmal nocturnal dyspnea, presyncope, syncope, edema or claudication. She does not smoke.     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        Calcium Carbonate-Vitamin D (CALCIUM + D) 600-200 MG-UNIT TABS Take 2 tablets by mouth daily. 200 tablet 4     cholecalciferol (VITAMIN D) 400 UNIT TABS Take 1 tablet by mouth daily. 100 tablet 4     gabapentin (NEURONTIN) 100 MG capsule TAKE 1 CAPSULE AT BEDTIME 90 capsule 3     GLUCOSAMINE 500 MG OR TABS 1 TABLET DAILY       losartan-hydrochlorothiazide (HYZAAR) 100-25 MG tablet TAKE 1 TABLET ONE TIME DAILY (NEEDS TO BE SEEN) 90 tablet 1     melatonin 3 MG tablet 1-2 po at bedtime as needed for sleep 60 tablet 5     metFORMIN (GLUCOPHAGE-XR) 750 MG 24 hr tablet TAKE 2 TABLETS (1,500 MG) DAILY (WITH DINNER) 180 tablet 1     metoprolol succinate ER (TOPROL-XL) 200 MG 24 hr tablet TAKE 1 TABLET EVERY DAY 90 tablet 1     Naproxen Sodium (ALEVE PO)        pravastatin (PRAVACHOL) 40 MG tablet TAKE 1 TABLET EVERY DAY 90 tablet 3     acetaminophen (TYLENOL) 500 MG tablet Take 2 tablets by mouth 2 times daily. (Patient not taking: Reported on 2019) 100 tablet 11     cyclobenzaprine (FLEXERIL) 5 MG tablet Take 1 tablet (5 mg) by mouth 3 times daily as needed for muscle spasms (Patient not taking: Reported on 2019) 30 tablet 1     famotidine (PEPCID) 20 MG tablet Take 1 tablet (20 mg) by mouth 2 times daily (Patient not taking: Reported on 2019) 60 tablet 5       PAST SURGICAL HISTORY:  Past Surgical History:   Procedure Laterality Date     D & C       HC FEMUR/KNEE SURG UNLISTED  8-    knee scope bilaterally     HEMORRHOIDECTOMY       ROTATOR CUFF REPAIR RT/LT      LT rotator cuff tear / Biceps tendod tear / glenoid labrum tear / Acromioclavicular arthritis     VERTEBROPLASTY  2012    T12       ALLERGIES     Allergies   Allergen Reactions     Lisinopril      cough       FAMILY HISTORY:  Family History   Problem Relation Age of Onset     Cerebrovascular Disease Mother 70     Gastrointestinal Disease Father         liver problems     Heart Disease Brother 58         68 MI      Glaucoma No family hx of      Macular Degeneration No family hx of        SOCIAL HISTORY:  Social History     Socioeconomic History     Marital status:      Spouse name: None     Number of children: 2     Years of education: None     Highest education level: None   Occupational History     Employer: RETIRED   Social Needs     Financial resource strain: None     Food insecurity:     Worry: None     Inability: None     Transportation needs:     Medical: None     Non-medical: None   Tobacco Use     Smoking status: Never Smoker     Smokeless tobacco: Never Used     Tobacco comment: No second hand exposure.   Substance and Sexual Activity     Alcohol use: No     Alcohol/week: 0.0 standard drinks     Drug use: No     Sexual activity: Not Currently     Partners: Male   Lifestyle     Physical activity:     Days per week: None     Minutes per session: None     Stress: None   Relationships     Social connections:     Talks on phone: None     Gets together: None     Attends Sikh service: None     Active member of club or organization: None     Attends meetings of clubs or organizations: None     Relationship status: None     Intimate partner violence:     Fear of current or ex partner: None     Emotionally abused: None     Physically abused: None     Forced sexual activity: None   Other Topics Concern     Parent/sibling w/ CABG, MI or angioplasty before 65F 55M? No      Service No     Blood Transfusions No     Caffeine Concern No     Comment: 1 cup tea daily     Occupational Exposure No     Hobby Hazards No     Sleep Concern No     Stress Concern No     Weight Concern No     Special Diet No     Back Care No     Exercise Yes     Comment: tries to walk 15 minutes daily.     Bike Helmet Not Asked     Seat Belt Yes     Self-Exams Not Asked   Social History Narrative    Lives in home alone.  1 son in [place] (Elbert), 1 in [place], [place] (Sylvain;  He has 3 kids);.  Retired.       ROS:   Constitutional: No  fever, chills, or sweats. No weight gain/loss   ENT: No visual disturbance, ear ache, epistaxis, sore throat  Allergies/Immunologic: Negative.   Respiratory: No cough, hemoptysia  Cardiovascular: As per HPI  GI: No nausea, vomiting, hematemesis, melena, or hematochezia  : No urinary frequency, dysuria, or hematuria  Integument: Negative  Psychiatric: Negative  Neuro: Negative  Endocrinology: Negative   Musculoskeletal: Negative    EXAM:  /82 (BP Location: Right arm, Patient Position: Chair, Cuff Size: Adult Regular)   Pulse 88   Ht 1.524 m (5')   Wt 63.5 kg (139 lb 14.4 oz)   SpO2 96%   BMI 27.32 kg/m    In general, the patient is a pleasant female in no apparent distress.      HEENT: NC/AT.  PERRLA.  EOMI.  Sclerae white, not injected.    Neck: Carotids 2+ bilaterally without bruits.  No jugular venous distension.   Lymph: No cervical adenopathy. No thyromegaly.   Heart: RRR. Normal S1, S2. No murmur, rub, click, or gallop. There is no heave.    Lungs: Clear bilaterally.  No rhonchi, wheezes, rales.   GI: Soft, nontender, nondistended.   Extremities: No edema.  The pulses are 2+at the radial and DP bilaterally.  Neuro: grossly non focal.   Skin: no rashes.  Endocrine: no thyromegaly  Musculoskeletal: no joint swelling or tenderness, gait normal.  Psych: pleasant and conversant      Labs:  LIPID RESULTS:  Lab Results   Component Value Date    CHOL 221 (H) 03/20/2019    HDL 46 (L) 03/20/2019     (H) 03/20/2019    TRIG 223 (H) 03/20/2019    CHOLHDLRATIO 3.8 11/02/2015    NHDL 175 (H) 03/20/2019       LIVER ENZYME RESULTS:  Lab Results   Component Value Date    AST 25 12/26/2019    ALT 28 12/26/2019       CBC RESULTS:  Lab Results   Component Value Date    WBC 8.7 12/26/2019    RBC 5.89 (H) 12/26/2019    HGB 12.5 12/26/2019    HCT 38.6 12/26/2019    MCV 66 (L) 12/26/2019    MCH 21.2 (L) 12/26/2019    MCHC 32.4 12/26/2019    RDW 16.5 (H) 12/26/2019     12/26/2019       BMP RESULTS:  Lab  Results   Component Value Date     12/26/2019    POTASSIUM 3.8 12/26/2019    CHLORIDE 106 12/26/2019    CO2 23 12/26/2019    ANIONGAP 12 12/26/2019     (H) 12/26/2019    BUN 18 12/26/2019    CR 0.61 12/26/2019    GFRESTIMATED 82 12/26/2019    GFRESTBLACK >90 12/26/2019    SUMEET 9.8 12/26/2019        A1C RESULTS:  Lab Results   Component Value Date    A1C 6.6 (H) 12/26/2019       INR RESULTS:  Lab Results   Component Value Date    INR 1.07 12/27/2012       Cardiac data:    ECG 12/2819 NSR non specific ST-T changes        Echocardiogram 12/201/9     Global and regional left ventricular function is hyperkinetic with an EF of 65-70%.  Mild concentric wall thickening consistent with left ventricular hypertrophy is present.Diastolic function not assessed due to significant mitral annular calcification.  Global right ventricular function is normal.There is mild to moderate right ventricular hypertrophy.  No pericardial effusion is present.  Mild dilatation of the aorta is present.  IVC diameter <2.1 cm collapsing >50% with sniff suggests a normal RA pressure of 3 mmHg.  Consider infiltrative CM such as amyloid.     No significant change from prior study.    Carotid US 12/12/19  1. Less than 50% diameter stenosis of the right ICA relative to the distal ICA diameter.   2. Less than 50% diameter stenosis of the left ICA relative to the distal ICA diameter.      Assessment and Plan:     David Ng is a 85 year old year old female with   Palpitations  Exertional dyspnea  Normal biventricular function  Aortic sclerosis  Mitral annular calcification  Essential hypertension  Mixed dyslipidemia  Adult onset diabetes A1c level 6.6%      Patient is without angina or heart failure symptoms. She is euvolemic on exam, blood pressure and heart rate are in normal range. ECG shows sinus rhythm. Recent labs reviewed, LDL-c 130 mg/dL, triglycerides 223, hemoglobin, renal and liver function within normal limits.  We reviewed  the findings from the Echo done in earlier this month which was notable for normal left and right ventricular size and function with mitral annular calcification and aortic sclerosis but no significant impairment of the function of the mitral valve with aortic valves.   The results of the carotid ultrasound from Dec 2019 were shared with the patient. It is reassuring that there is non obstructive disease.     I will arrange for a 14-day Zio patch to assess for paroxysmal atrial fibrillation given her symptoms of palpitations.  After the completion of this ambulatory ECG monitoring I will arrange for an MPI stress test to assess for inducible ischemia.  I have recommend no changes to the guideline directed medical therapy that she is on at this point. She is also on aspirin 325 mg/d. I have re-emphasized the importance of a low salt diet and daily exercise.     Follow-up:   Return to clinic in 4 weeks.  Patient's son was advised to contact us by MyChart or by calling the clinic if there are any urgent issues.    Angelita Mckay MD, MS  Staff Cardiologist  Pager: [phone]      CC  Patient Care Team:  Jayden Young MD as PCP - General (Family Practice)  Jayden Young MD as Assigned PCP  Charlotte Fenton RD as Diabetes Educator (Dietitian, Registered)  JAYDEN YOUNG

## 2019-12-30 ENCOUNTER — ANCILLARY PROCEDURE (OUTPATIENT)
Dept: ULTRASOUND IMAGING | Facility: CLINIC | Age: 84
End: 2019-12-30
Attending: FAMILY MEDICINE
Payer: COMMERCIAL

## 2019-12-30 DIAGNOSIS — I16.0 HYPERTENSIVE URGENCY: ICD-10-CM

## 2019-12-30 LAB — INTERPRETATION ECG - MUSE: NORMAL

## 2019-12-30 PROCEDURE — 76775 US EXAM ABDO BACK WALL LIM: CPT

## 2019-12-31 NOTE — RESULT ENCOUNTER NOTE
The aorta is slightly bigger than normal but not worrisome.  You do not have an aneurysm.    Jayden Munoz MD

## 2020-01-08 ENCOUNTER — HOSPITAL ENCOUNTER (OUTPATIENT)
Dept: NUCLEAR MEDICINE | Facility: CLINIC | Age: 85
Setting detail: NUCLEAR MEDICINE
End: 2020-01-08
Attending: INTERNAL MEDICINE
Payer: COMMERCIAL

## 2020-01-08 ENCOUNTER — HOSPITAL ENCOUNTER (OUTPATIENT)
Dept: CARDIOLOGY | Facility: CLINIC | Age: 85
Discharge: HOME | End: 2020-01-08
Attending: INTERNAL MEDICINE | Admitting: INTERNAL MEDICINE
Payer: COMMERCIAL

## 2020-01-08 DIAGNOSIS — R06.09 DOE (DYSPNEA ON EXERTION): ICD-10-CM

## 2020-01-08 LAB
CV STRESS MAX HR HE: 105
RATE PRESSURE PRODUCT: NORMAL
STRESS ECHO BASELINE DIASTOLIC HE: 81
STRESS ECHO BASELINE HR: 91
STRESS ECHO BASELINE SYSTOLIC BP: 176
STRESS ECHO CALCULATED PERCENT HR: 78 %
STRESS ECHO LAST STRESS DIASTOLIC BP: 63
STRESS ECHO LAST STRESS SYSTOLIC BP: 131
STRESS ECHO TARGET HR: 135

## 2020-01-08 PROCEDURE — 93016 CV STRESS TEST SUPVJ ONLY: CPT | Performed by: INTERNAL MEDICINE

## 2020-01-08 PROCEDURE — 93017 CV STRESS TEST TRACING ONLY: CPT

## 2020-01-08 PROCEDURE — 78452 HT MUSCLE IMAGE SPECT MULT: CPT | Mod: 26 | Performed by: INTERNAL MEDICINE

## 2020-01-08 PROCEDURE — A9502 TC99M TETROFOSMIN: HCPCS | Performed by: INTERNAL MEDICINE

## 2020-01-08 PROCEDURE — 34300033 ZZH RX 343: Performed by: INTERNAL MEDICINE

## 2020-01-08 PROCEDURE — 93018 CV STRESS TEST I&R ONLY: CPT | Performed by: INTERNAL MEDICINE

## 2020-01-08 PROCEDURE — 25000128 H RX IP 250 OP 636: Performed by: INTERNAL MEDICINE

## 2020-01-08 PROCEDURE — 78452 HT MUSCLE IMAGE SPECT MULT: CPT

## 2020-01-08 RX ORDER — REGADENOSON 0.08 MG/ML
0.4 INJECTION, SOLUTION INTRAVENOUS ONCE
Status: COMPLETED | OUTPATIENT
Start: 2020-01-08 | End: 2020-01-08

## 2020-01-08 RX ORDER — ALBUTEROL SULFATE 90 UG/1
2 AEROSOL, METERED RESPIRATORY (INHALATION) EVERY 5 MIN PRN
Status: DISCONTINUED | OUTPATIENT
Start: 2020-01-08 | End: 2020-01-09 | Stop reason: HOSPADM

## 2020-01-08 RX ORDER — AMINOPHYLLINE 25 MG/ML
50-100 INJECTION, SOLUTION INTRAVENOUS
Status: DISCONTINUED | OUTPATIENT
Start: 2020-01-08 | End: 2020-01-09 | Stop reason: HOSPADM

## 2020-01-08 RX ORDER — ACYCLOVIR 200 MG/1
0-1 CAPSULE ORAL
Status: DISCONTINUED | OUTPATIENT
Start: 2020-01-08 | End: 2020-01-09 | Stop reason: HOSPADM

## 2020-01-08 RX ADMIN — TETROFOSMIN 35 MCI.: 1.38 INJECTION, POWDER, LYOPHILIZED, FOR SOLUTION INTRAVENOUS at 11:24

## 2020-01-08 RX ADMIN — REGADENOSON 0.4 MG: 0.08 INJECTION, SOLUTION INTRAVENOUS at 11:21

## 2020-01-08 RX ADMIN — TETROFOSMIN 9.4 MCI.: 1.38 INJECTION, POWDER, LYOPHILIZED, FOR SOLUTION INTRAVENOUS at 10:16

## 2020-01-21 ENCOUNTER — TRANSFERRED RECORDS (OUTPATIENT)
Dept: HEALTH INFORMATION MANAGEMENT | Facility: CLINIC | Age: 85
End: 2020-01-21

## 2020-01-29 ENCOUNTER — DOCUMENTATION ONLY (OUTPATIENT)
Dept: CARE COORDINATION | Facility: CLINIC | Age: 85
End: 2020-01-29

## 2020-02-23 ENCOUNTER — HEALTH MAINTENANCE LETTER (OUTPATIENT)
Age: 85
End: 2020-02-23

## 2020-02-24 ENCOUNTER — OFFICE VISIT (OUTPATIENT)
Dept: CARDIOLOGY | Facility: CLINIC | Age: 85
End: 2020-02-24
Attending: INTERNAL MEDICINE
Payer: COMMERCIAL

## 2020-02-24 VITALS
WEIGHT: 139.7 LBS | DIASTOLIC BLOOD PRESSURE: 82 MMHG | HEIGHT: 60 IN | HEART RATE: 83 BPM | SYSTOLIC BLOOD PRESSURE: 154 MMHG | BODY MASS INDEX: 27.43 KG/M2 | OXYGEN SATURATION: 95 %

## 2020-02-24 DIAGNOSIS — E78.5 DYSLIPIDEMIA: ICD-10-CM

## 2020-02-24 DIAGNOSIS — I20.89 MICROVASCULAR ANGINA (H): Primary | ICD-10-CM

## 2020-02-24 DIAGNOSIS — I10 ESSENTIAL HYPERTENSION: ICD-10-CM

## 2020-02-24 PROCEDURE — 99214 OFFICE O/P EST MOD 30 MIN: CPT | Mod: GC | Performed by: INTERNAL MEDICINE

## 2020-02-24 PROCEDURE — 93005 ELECTROCARDIOGRAM TRACING: CPT | Mod: ZF

## 2020-02-24 PROCEDURE — 93010 ELECTROCARDIOGRAM REPORT: CPT | Mod: ZP | Performed by: INTERNAL MEDICINE

## 2020-02-24 PROCEDURE — G0463 HOSPITAL OUTPT CLINIC VISIT: HCPCS | Mod: 25,ZF

## 2020-02-24 RX ORDER — ISOSORBIDE MONONITRATE 30 MG/1
30 TABLET, EXTENDED RELEASE ORAL DAILY
Qty: 90 TABLET | Refills: 1 | Status: SHIPPED | OUTPATIENT
Start: 2020-02-24 | End: 2020-06-30

## 2020-02-24 ASSESSMENT — MIFFLIN-ST. JEOR: SCORE: 1000.18

## 2020-02-24 ASSESSMENT — PAIN SCALES - GENERAL: PAINLEVEL: NO PAIN (0)

## 2020-02-24 NOTE — PROGRESS NOTES
HPI:    84 y/o F with PMH of hypertension, dyslipidemia, diet controlled diabetes. She was seen on 12/28/19 for chest tightness and palpitations which she described as a rapid heart beats that last for a few minutes. She underwent MPI stress test and ziopatch and both were unremarkable (see below).     She today still notes mild discomfort, not pain, and worsens with exertion. She says it feels like if she is depressed. Patient denies orthopnea, paroxysmal nocturnal dyspnea, presyncope, syncope, edema or claudication. She does not smoke.        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 (Patient not taking: Reported on 12/28/2019) 100 tablet 11     amLODIPine (NORVASC) 10 MG tablet Take 1 tablet (10 mg) by mouth daily 90 tablet 0     aspirin 325 MG EC tablet Take 1-2 tablets by mouth daily as needed.       Calcium Carbonate-Vitamin D (CALCIUM + D) 600-200 MG-UNIT TABS Take 2 tablets by mouth daily. 200 tablet 4     cholecalciferol (VITAMIN D) 400 UNIT TABS Take 1 tablet by mouth daily. 100 tablet 4     cyclobenzaprine (FLEXERIL) 5 MG tablet Take 1 tablet (5 mg) by mouth 3 times daily as needed for muscle spasms (Patient not taking: Reported on 12/28/2019) 30  tablet 1     famotidine (PEPCID) 20 MG tablet Take 1 tablet (20 mg) by mouth 2 times daily (Patient not taking: Reported on 12/28/2019) 60 tablet 5     gabapentin (NEURONTIN) 100 MG capsule TAKE 1 CAPSULE AT BEDTIME 90 capsule 3     GLUCOSAMINE 500 MG OR TABS 1 TABLET DAILY       losartan-hydrochlorothiazide (HYZAAR) 100-25 MG tablet TAKE 1 TABLET ONE TIME DAILY 90 tablet 1     melatonin 3 MG tablet 1-2 po at bedtime as needed for sleep 60 tablet 5     metFORMIN (GLUCOPHAGE-XR) 750 MG 24 hr tablet TAKE 2 TABLETS (1,500 MG) DAILY (WITH DINNER) 180 tablet 1     metoprolol succinate ER (TOPROL-XL) 200 MG 24 hr tablet TAKE 1 TABLET EVERY DAY 90 tablet 1     Naproxen Sodium (ALEVE PO)        pravastatin (PRAVACHOL) 40 MG tablet TAKE 1 TABLET EVERY DAY 90 tablet 3       PAST SURGICAL HISTORY:  Past Surgical History:   Procedure Laterality Date     D & C       HC FEMUR/KNEE SURG UNLISTED  8-    knee scope bilaterally     HEMORRHOIDECTOMY       ROTATOR CUFF REPAIR RT/LT      LT rotator cuff tear / Biceps tendod tear / glenoid labrum tear / Acromioclavicular arthritis     VERTEBROPLASTY  12/27/2012    T12       ALLERGIES:     Allergies   Allergen Reactions     Lisinopril      cough       FAMILY HISTORY:  - Premature coronary artery disease  - Sudden cardiac death     SOCIAL HISTORY:  Social History     Tobacco Use     Smoking status: Never Smoker     Smokeless tobacco: Never Used     Tobacco comment: No second hand exposure.   Substance Use Topics     Alcohol use: No     Alcohol/week: 0.0 standard drinks     Drug use: No       ROS:   Constitutional: No fever, chills, or sweats. Weight stable.   ENT: No visual disturbance, ear ache, epistaxis, sore throat.   Cardiovascular: As per HPI.   Respiratory: No cough, hemoptysis.    GI: No nausea, vomiting, hematemesis, melena, or hematochezia.   : No hematuria.   Integument: Negative.   Psychiatric: Negative.   Hematologic:  Easy bruising, no easy bleeding.  Neuro:  Negative.   Endocrinology: No significant heat or cold intolerance   Musculoskeletal: No myalgia.    Exam:  BP (!) 154/82 (BP Location: Left arm, Patient Position: Chair, Cuff Size: Adult Regular)   Pulse 83   Ht 1.524 m (5')   Wt 63.4 kg (139 lb 11.2 oz)   SpO2 95%   BMI 27.28 kg/m    GENERAL APPEARANCE: healthy, alert and no distress  HEENT: no icterus  NECK: no adenopathy, JVP not elevated  RESPIRATORY: lungs clear to auscultation   CARDIOVASCULAR: regular rhythm, normal S1 with physiologic split S2, no S3 or S4 and no murmur, click or rub, precordium quiet with normal PMI.  ABDOMEN: soft, non tender  EXTREMITIES: peripheral pulses normal, no edema, no bruits  NEURO: alert and oriented to person/place/time, normal speech, gait and affect  VASC: Radial pulses are normal in volumes and symmetric bilaterally. No bruits are heard.  SKIN: no ecchymoses, no rashes    Labs:  CBC RESULTS:   Lab Results   Component Value Date    WBC 8.7 12/26/2019    RBC 5.89 (H) 12/26/2019    HGB 12.5 12/26/2019    HCT 38.6 12/26/2019    MCV 66 (L) 12/26/2019    MCH 21.2 (L) 12/26/2019    MCHC 32.4 12/26/2019    RDW 16.5 (H) 12/26/2019     12/26/2019       BMP RESULTS:  Lab Results   Component Value Date     12/26/2019    POTASSIUM 3.8 12/26/2019    CHLORIDE 106 12/26/2019    CO2 23 12/26/2019    ANIONGAP 12 12/26/2019     (H) 12/26/2019    BUN 18 12/26/2019    CR 0.61 12/26/2019    GFRESTIMATED 82 12/26/2019    GFRESTBLACK >90 12/26/2019    SUMEET 9.8 12/26/2019        INR RESULTS:  Lab Results   Component Value Date    INR 1.07 12/27/2012       Cardiac testing:    ECG 2/24/2020 NSR non specific ST-T changes          ECG 12/28/19 NSR non specific ST-T changes          Echocardiogram 12/2019     Global and regional left ventricular function is hyperkinetic with an EF of 65-70%.  Mild concentric wall thickening consistent with left ventricular hypertrophy is present.Diastolic function not assessed due to significant  mitral annular calcification.  Global right ventricular function is normal.There is mild to moderate right ventricular hypertrophy.  No pericardial effusion is present.  Mild dilatation of the aorta is present.  IVC diameter <2.1 cm collapsing >50% with sniff suggests a normal RA pressure of 3 mmHg.  Consider infiltrative CM such as amyloid.     No significant change from prior study.     Carotid US 12/12/19  1. Less than 50% diameter stenosis of the right ICA relative to the distal ICA diameter.   2. Less than 50% diameter stenosis of the left ICA relative to the distal ICA diameter.       ZIOpatch 12/28/2019  Min HR 47  Max   Avg HR 71  Rare PACS, PVCs, no pauses, VT, av block, afib    Assessment and Plan:      David Ng is a 85 year old year old female with HTN, HLD, DM who presented on prior visit with chest tightness and palpitations.      Palpitations   Ziopatch did not show any arrhythmia, no triggered events     Dyspnea on exertion with chest tightness  ECHO without significant valvular disease , systolic LV or RV dysfunction   MPI showed was negative for inducible myocardial ischemia or infarction.  LV EF over 85%.    Henry Reed MD  PGY 4 Cardiology fellow      ATTENDING ATTESTATION:  This patient has been seen and examined by me February 24, 2020 with  Henry Reed MD, cardiology fellow. I have reviewed the vitals, laboratory and imaging data relevant to this patient's care. I have edited this note to reflect our joint assessment and plan, and discussed the plan with the patient.    David Ng is a 85 year old year old female with     Normal biventricular function  Aortic sclerosis  Mitral annular calcification  Essential hypertension  Mixed dyslipidemia  Adult onset diabetes A1c level 6.6%  MPI stress test without ischemia 1 /8/2020  Microvascular angina    Patient is still with mild chest pain but without heart failure symptoms. She is euvolemic on exam, blood pressure  is mildly elevated and heart rate are in normal range. ECG shows sinus rhythm. Recent labs reviewed, LDL-c 130 mg/dL, triglycerides 223, hemoglobin, renal and liver function within normal limits. Echo in Dec 2019 was notable for normal left and right ventricular size and function with mitral annular calcification and aortic sclerosis but no significant impairment of the function of the mitral valve with aortic valves.  Cardiac investigations done after her last visit have all been favorable. The carotid ultrasound from Dec 2019 notable for non obstructive disease. 14-day Zio patch in Dec 2019 did not reveal any significant arrhythmias or atrial fibrillation. MPI stress test in Jan 2020 was negative for inducible ischemia.     I did offer a coronary angiogram to assess for obstructive disease given her ongoing symptoms, but she prefers less invasive approach and medical therapy. I have recommend a low dose isosorbide 30 mg/d to optimize antianginal regimen as she could have microvascular angina and to continue the guideline directed medical therapy that she is on at this point. I have re-emphasized the importance of a low salt diet and daily exercise. She would benefit for a seeing a mental health professional for depression and insomnia which she is reluctant to do.     Follow-up:   Return to clinic in 6 months.  Patient's son was advised to contact us by MyChart or by calling the clinic if there are any urgent issues.    Gavino Gamboa MD, MS  Staff Cardiologist  Pager: [phone]          CC  Patient Care Team:  Jayden Munoz MD as PCP - General (Family Practice)  Jayden Munoz MD as Assigned PCP  Charlotte Fenton RD as Diabetes Educator (Dietitian, Registered)  GAVINO GAMBOA

## 2020-02-24 NOTE — PATIENT INSTRUCTIONS
Patient Instructions:  It was a pleasure to see you in the cardiology clinic today.      If you have any questions, call  Margaret Giron RN, at [phone].  Press Option #1 for the [facility], and then press Option #4  We are encouraging the use of Secure-NOKt to communicate with your HealthCare Provider    Note the new or changes to your medications: START Imdur 30 mg  Stop the following medications: None    The results from today include: None  Please follow up with Dr. Mckay in 6 months      If you have an urgent need after hours (8:00 am to 4:30 pm) please call [phone] and ask for the cardiology fellow on call.

## 2020-02-24 NOTE — LETTER
2/24/2020      RE: David Ng  [address] [address] [facility] [address]       Dear Colleague,    Thank you for the opportunity to participate in the care of your patient, David Ng, at the [facility]. Please see a copy of my visit note below.    HPI:    86 y/o F with PMH of hypertension, dyslipidemia, diet controlled diabetes. She was seen on 12/28/19 for chest tightness and palpitations which she described as a rapid heart beats that last for a few minutes. She underwent MPI stress test and ziopatch and both were unremarkable (see below).     She today still notes mild discomfort, not pain, and worsens with exertion. She says it feels like if she is depressed. Patient denies orthopnea, paroxysmal nocturnal dyspnea, presyncope, syncope, edema or claudication. She does not smoke.        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 (Patient not taking: Reported on 12/28/2019) 100 tablet 11     amLODIPine (NORVASC) 10 MG tablet Take 1 tablet (10 mg) by mouth daily 90 tablet 0     aspirin 325 MG EC tablet Take 1-2 tablets by mouth daily as needed.       Calcium Carbonate-Vitamin D  (CALCIUM + D) 600-200 MG-UNIT TABS Take 2 tablets by mouth daily. 200 tablet 4     cholecalciferol (VITAMIN D) 400 UNIT TABS Take 1 tablet by mouth daily. 100 tablet 4     cyclobenzaprine (FLEXERIL) 5 MG tablet Take 1 tablet (5 mg) by mouth 3 times daily as needed for muscle spasms (Patient not taking: Reported on 12/28/2019) 30 tablet 1     famotidine (PEPCID) 20 MG tablet Take 1 tablet (20 mg) by mouth 2 times daily (Patient not taking: Reported on 12/28/2019) 60 tablet 5     gabapentin (NEURONTIN) 100 MG capsule TAKE 1 CAPSULE AT BEDTIME 90 capsule 3     GLUCOSAMINE 500 MG OR TABS 1 TABLET DAILY       losartan-hydrochlorothiazide (HYZAAR) 100-25 MG tablet TAKE 1 TABLET ONE TIME DAILY 90 tablet 1     melatonin 3 MG tablet 1-2 po at bedtime as needed for sleep 60 tablet 5     metFORMIN (GLUCOPHAGE-XR) 750 MG 24 hr tablet TAKE 2 TABLETS (1,500 MG) DAILY (WITH DINNER) 180 tablet 1     metoprolol succinate ER (TOPROL-XL) 200 MG 24 hr tablet TAKE 1 TABLET EVERY DAY 90 tablet 1     Naproxen Sodium (ALEVE PO)        pravastatin (PRAVACHOL) 40 MG tablet TAKE 1 TABLET EVERY DAY 90 tablet 3       PAST SURGICAL HISTORY:  Past Surgical History:   Procedure Laterality Date     D & C        FEMUR/KNEE SURG UNLISTED  8-    knee scope bilaterally     HEMORRHOIDECTOMY       ROTATOR CUFF REPAIR RT/LT      LT rotator cuff tear / Biceps tendod tear / glenoid labrum tear / Acromioclavicular arthritis     VERTEBROPLASTY  12/27/2012    T12       ALLERGIES:     Allergies   Allergen Reactions     Lisinopril      cough       FAMILY HISTORY:  - Premature coronary artery disease  - Sudden cardiac death     SOCIAL HISTORY:  Social History     Tobacco Use     Smoking status: Never Smoker     Smokeless tobacco: Never Used     Tobacco comment: No second hand exposure.   Substance Use Topics     Alcohol use: No     Alcohol/week: 0.0 standard drinks     Drug use: No       ROS:   Constitutional: No fever, chills, or sweats. Weight  stable.   ENT: No visual disturbance, ear ache, epistaxis, sore throat.   Cardiovascular: As per HPI.   Respiratory: No cough, hemoptysis.    GI: No nausea, vomiting, hematemesis, melena, or hematochezia.   : No hematuria.   Integument: Negative.   Psychiatric: Negative.   Hematologic:  Easy bruising, no easy bleeding.  Neuro: Negative.   Endocrinology: No significant heat or cold intolerance   Musculoskeletal: No myalgia.    Exam:  BP (!) 154/82 (BP Location: Left arm, Patient Position: Chair, Cuff Size: Adult Regular)   Pulse 83   Ht 1.524 m (5')   Wt 63.4 kg (139 lb 11.2 oz)   SpO2 95%   BMI 27.28 kg/m     GENERAL APPEARANCE: healthy, alert and no distress  HEENT: no icterus  NECK: no adenopathy, JVP not elevated  RESPIRATORY: lungs clear to auscultation   CARDIOVASCULAR: regular rhythm, normal S1 with physiologic split S2, no S3 or S4 and no murmur, click or rub, precordium quiet with normal PMI.  ABDOMEN: soft, non tender  EXTREMITIES: peripheral pulses normal, no edema, no bruits  NEURO: alert and oriented to person/place/time, normal speech, gait and affect  VASC: Radial pulses are normal in volumes and symmetric bilaterally. No bruits are heard.  SKIN: no ecchymoses, no rashes    Labs:  CBC RESULTS:   Lab Results   Component Value Date    WBC 8.7 12/26/2019    RBC 5.89 (H) 12/26/2019    HGB 12.5 12/26/2019    HCT 38.6 12/26/2019    MCV 66 (L) 12/26/2019    MCH 21.2 (L) 12/26/2019    MCHC 32.4 12/26/2019    RDW 16.5 (H) 12/26/2019     12/26/2019       BMP RESULTS:  Lab Results   Component Value Date     12/26/2019    POTASSIUM 3.8 12/26/2019    CHLORIDE 106 12/26/2019    CO2 23 12/26/2019    ANIONGAP 12 12/26/2019     (H) 12/26/2019    BUN 18 12/26/2019    CR 0.61 12/26/2019    GFRESTIMATED 82 12/26/2019    GFRESTBLACK >90 12/26/2019    SUMEET 9.8 12/26/2019        INR RESULTS:  Lab Results   Component Value Date    INR 1.07 12/27/2012       Cardiac testing:    ECG 2/24/2020 NSR non  specific ST-T changes          ECG 12/28/19 NSR non specific ST-T changes          Echocardiogram 12/2019     Global and regional left ventricular function is hyperkinetic with an EF of 65-70%.  Mild concentric wall thickening consistent with left ventricular hypertrophy is present.Diastolic function not assessed due to significant mitral annular calcification.  Global right ventricular function is normal.There is mild to moderate right ventricular hypertrophy.  No pericardial effusion is present.  Mild dilatation of the aorta is present.  IVC diameter <2.1 cm collapsing >50% with sniff suggests a normal RA pressure of 3 mmHg.  Consider infiltrative CM such as amyloid.     No significant change from prior study.     Carotid US 12/12/19  1. Less than 50% diameter stenosis of the right ICA relative to the distal ICA diameter.   2. Less than 50% diameter stenosis of the left ICA relative to the distal ICA diameter.       ZIOpatch 12/28/2019  Min HR 47  Max   Avg HR 71  Rare PACS, PVCs, no pauses, VT, av block, afib    Assessment and Plan:      David Ng is a 85 year old year old female with  HTN, HLD, DM who presented on prior visit with chest tightness and palpitations.      Palpitations   Ziopatch did not show any arrhythmia, no triggered events     Dyspnea on exertion with chest tightness  ECHO without significant valvular disease , systolic LV or RV dysfunction   MPI showed was negative for inducible myocardial ischemia or infarction.  LV EF over 85%.    Henry Reed MD  PGY 4 Cardiology fellow      ATTENDING ATTESTATION:  This patient has been seen and examined by me February 24, 2020 with  Henry Reed MD, cardiology fellow. I have reviewed the vitals, laboratory and imaging data relevant to this patient's care. I have edited this note to reflect our joint assessment and plan, and discussed the plan with the patient.    David Ng is a 85 year old year old female with     Normal  biventricular function  Aortic sclerosis  Mitral annular calcification  Essential hypertension  Mixed dyslipidemia  Adult onset diabetes A1c level 6.6%  MPI stress test without ischemia 1 /8/2020  Microvascular angina    Patient is still with mild chest pain but without heart failure symptoms. She is euvolemic on exam, blood pressure is mildly elevated and heart rate are in normal range. ECG shows sinus rhythm. Recent labs reviewed, LDL-c 130 mg/dL, triglycerides 223, hemoglobin, renal and liver function within normal limits. Echo in Dec 2019 was notable for normal left and right ventricular size and function with mitral annular calcification and aortic sclerosis but no significant impairment of the function of the mitral valve with aortic valves.   Cardiac investigations done after her last visit have all been favorable. The carotid ultrasound from Dec 2019 notable for non obstructive disease. 14-day Zio patch  in Dec 2019 did not reveal any significant arrhythmias or atrial fibrillation. MPI stress test in Jan 2020 was negative for inducible ischemia.     I did offer a coronary angiogram to assess for obstructive disease given her ongoing symptoms, but she prefers less invasive approach and medical therapy. I have recommend  a low dose isosorbide 30 mg/d to optimize antianginal regimen as she could have microvascular angina and to continue the guideline directed medical therapy that she is on at this point. I have re-emphasized the importance of a low salt diet and daily exercise. She would benefit for a seeing a mental health professional for depression and insomnia which she is reluctant to do.  Follow-up:   Return to clinic in  6 months.  Patient's son was advised to contact us by MyChart or by calling the clinic if there are any urgent issues.    Angelita Mckay MD, MS  Staff Cardiologist  Pager: [phone]      CC  Patient Care Team:  Jayden Munoz MD as PCP - General (Family Practice)  Jayden Munoz  MD ANUP as Assigned PCP  Charlotte Fenton RD as Diabetes Educator (Dietitian, Registered)  GAVINO GAMBOA

## 2020-02-24 NOTE — NURSING NOTE
Chief Complaint   Patient presents with     Follow Up     Return, 84 yo female, Franc Reyes and done. hypertension, dyslipidemia, diet controlled diabetes. She has no known heart disease.      Vitals were taken and medications were reconciled. EKG was performed.    Savita Roe  10:42 AM

## 2020-02-25 LAB — INTERPRETATION ECG - MUSE: NORMAL

## 2020-05-06 DIAGNOSIS — E11.9 TYPE 2 DIABETES MELLITUS WITHOUT COMPLICATION, WITHOUT LONG-TERM CURRENT USE OF INSULIN (H): ICD-10-CM

## 2020-05-07 RX ORDER — METFORMIN HYDROCHLORIDE 750 MG/1
TABLET, EXTENDED RELEASE ORAL
Qty: 180 TABLET | Refills: 1 | Status: SHIPPED | OUTPATIENT
Start: 2020-05-07 | End: 2020-09-18

## 2020-05-07 NOTE — TELEPHONE ENCOUNTER
Prescription approved per [facility] Refill Protocol.      Brittany Moody RN  [facility]

## 2020-06-26 DIAGNOSIS — I10 ESSENTIAL HYPERTENSION: ICD-10-CM

## 2020-06-26 DIAGNOSIS — I10 HYPERTENSION GOAL BP (BLOOD PRESSURE) < 140/90: Primary | ICD-10-CM

## 2020-06-30 RX ORDER — ISOSORBIDE MONONITRATE 30 MG/1
30 TABLET, EXTENDED RELEASE ORAL DAILY
Qty: 60 TABLET | Refills: 0 | Status: SHIPPED | OUTPATIENT
Start: 2020-06-30 | End: 2020-08-25

## 2020-06-30 NOTE — TELEPHONE ENCOUNTER
ISOSORBIDE MONONITRATE ER 30 MG Tablet Extended Release 24 Hour    Last Written Prescription Date:  2/24/2020  Last Fill Quantity: 90,   # refills: 1  Last Office Visit : 2/24/2020  Future Office visit:  None    Routing refill request to provider for review/approval because:  Blood pressure out of range    Per refill protocol, Must refer to clinic for review and refill per Providers orders    BP Readings from Last 3 Encounters:   02/24/20 (!) 154/82   12/28/19 137/82   12/26/19 (!) 155/80        Berna Bustos RN  Central Triage Red Flags/Med Refills

## 2020-08-20 DIAGNOSIS — I10 ESSENTIAL HYPERTENSION: ICD-10-CM

## 2020-08-26 RX ORDER — ISOSORBIDE MONONITRATE 30 MG/1
30 TABLET, EXTENDED RELEASE ORAL DAILY
Qty: 90 TABLET | Refills: 3 | Status: SHIPPED | OUTPATIENT
Start: 2020-08-26 | End: 2020-09-18

## 2020-09-14 ENCOUNTER — OFFICE VISIT (OUTPATIENT)
Dept: OPTOMETRY | Facility: CLINIC | Age: 85
End: 2020-09-14
Payer: COMMERCIAL

## 2020-09-14 DIAGNOSIS — H52.223 REGULAR ASTIGMATISM OF BOTH EYES: ICD-10-CM

## 2020-09-14 DIAGNOSIS — H52.13 MYOPIA OF BOTH EYES: ICD-10-CM

## 2020-09-14 DIAGNOSIS — H52.4 PRESBYOPIA: ICD-10-CM

## 2020-09-14 DIAGNOSIS — Z01.01 ENCOUNTER FOR EXAMINATION OF EYES AND VISION WITH ABNORMAL FINDINGS: Primary | ICD-10-CM

## 2020-09-14 DIAGNOSIS — H25.813 COMBINED FORMS OF AGE-RELATED CATARACT OF BOTH EYES: ICD-10-CM

## 2020-09-14 DIAGNOSIS — E11.3293 MILD NONPROLIFERATIVE DIABETIC RETINOPATHY OF BOTH EYES WITHOUT MACULAR EDEMA ASSOCIATED WITH TYPE 2 DIABETES MELLITUS (H): ICD-10-CM

## 2020-09-14 PROCEDURE — 92014 COMPRE OPH EXAM EST PT 1/>: CPT | Performed by: OPTOMETRIST

## 2020-09-14 PROCEDURE — 92015 DETERMINE REFRACTIVE STATE: CPT | Mod: GY | Performed by: OPTOMETRIST

## 2020-09-14 ASSESSMENT — REFRACTION_MANIFEST
OS_AXIS: 002
OS_CYLINDER: +2.25
OS_SPHERE: -1.25
OD_CYLINDER: +2.00
OD_AXIS: 178
OD_ADD: +3.00
OD_SPHERE: -2.50
OS_ADD: +3.00

## 2020-09-14 ASSESSMENT — SLIT LAMP EXAM - LIDS
COMMENTS: 1+ BLEPHARITIS
COMMENTS: 1+ BLEPHARITIS

## 2020-09-14 ASSESSMENT — VISUAL ACUITY
OD_CC: 20/50
METHOD: SNELLEN - LINEAR
OS_SC: 20/60
OS_CC: 20/40
OD_SC: 20/500
OS_SC+: -1
OD_CC+: -2
OD_CC: 20/30
OS_CC+: -1
OS_CC: 20/50

## 2020-09-14 ASSESSMENT — EXTERNAL EXAM - RIGHT EYE: OD_EXAM: NORMAL

## 2020-09-14 ASSESSMENT — TONOMETRY
OD_IOP_MMHG: 12
OS_IOP_MMHG: 09
IOP_METHOD: ICARE

## 2020-09-14 ASSESSMENT — REFRACTION_WEARINGRX
OS_CYLINDER: +1.75
OS_ADD: +3.00
OD_SPHERE: -2.25
OS_AXIS: 180
SPECS_TYPE: PAL
OD_AXIS: 005
OD_CYLINDER: +1.50
OS_SPHERE: -2.00
OD_ADD: +3.00

## 2020-09-14 ASSESSMENT — CUP TO DISC RATIO
OD_RATIO: 0.3
OS_RATIO: 0.3

## 2020-09-14 ASSESSMENT — EXTERNAL EXAM - LEFT EYE: OS_EXAM: NORMAL

## 2020-09-14 ASSESSMENT — CONF VISUAL FIELD
OS_NORMAL: 1
METHOD: COUNTING FINGERS
OD_NORMAL: 1

## 2020-09-14 NOTE — PATIENT INSTRUCTIONS
Updated glasses prescription provided today. Optional to fill- mild change to power.     You have the formation of cataracts.  You may notice some blurred vision or glare with night driving.  It is important that you wear good sunglasses to protect your eyes from the ultraviolet light from the sun.     Patient educated on importance of good blood sugar control.  Letter sent to primary care provider with diabetic eye exam report.     Return in 1 year for comprehensive eye exam, or sooner if needed.     The effects of the dilating drops last for 4- 6 hours.  You will be more sensitive to light and vision will be blurry up close.  Mydriatic sunglasses were given if needed.    Nitin Jacobson, OD  [facility]  [address] [place] [address]. CARL[place] Anderson  [id number]    [phone]

## 2020-09-14 NOTE — PROGRESS NOTES
"Chief Complaint   Patient presents with     Annual Eye Exam     Patient states she is pre-diabetic       Hemoglobin A1C   Date Value Ref Range Status   12/26/2019 6.6 (H) 0 - 5.6 % Final     Comment:     Normal <5.7% Prediabetes 5.7-6.4%  Diabetes 6.5% or higher - adopted from ADA   consensus guidelines.     03/20/2019 6.4 (H) 0 - 5.6 % Final     Comment:     Normal <5.7% Prediabetes 5.7-6.4%  Diabetes 6.5% or higher - adopted from ADA   consensus guidelines.     02/05/2018 6.4 (H) 4.3 - 6.0 % Final         Last Eye Exam: 7/2017  Dilated Previously: Yes    What are you currently using to see?  Glasses - progressive  Distance Vision Acuity: Satisfied with vision    Near Vision Acuity: Satisfied with vision while reading  with glasses    Eye Comfort: good  Do you use eye drops? : No  Occupation or Hobbies: retired    [employer]    **Patient is no longer experiencing the \"red spots\" in her vision. She states they gradually resolved and hasn't seen them for a couple of months now. Patient had MRI in December 2019 yielding normal results. Normal carotid ultrasound.        Medical, surgical and family histories reviewed and updated 9/14/2020.       OBJECTIVE: See Ophthalmology exam    ASSESSMENT:    ICD-10-CM    1. Encounter for examination of eyes and vision with abnormal findings  Z01.01    2. Combined forms of age-related cataract of both eyes  H25.813    3. Mild nonproliferative diabetic retinopathy of both eyes without macular edema associated with type 2 diabetes mellitus (H)  E11.3293    4. Myopia of both eyes  H52.13    5. Regular astigmatism of both eyes  H52.223    6. Presbyopia  H52.4       PLAN:    David Ng aware  eye exam results will be sent to Jayden Munoz  Patient Instructions   Updated glasses prescription provided today. Optional to fill- mild change to power.     You have the formation of cataracts.  You may notice some blurred vision or glare with night driving.  It is " important that you wear good sunglasses to protect your eyes from the ultraviolet light from the sun.     Patient educated on importance of good blood sugar control.  Letter sent to primary care provider with diabetic eye exam report.     Return in 1 year for comprehensive eye exam, or sooner if needed.     The effects of the dilating drops last for 4- 6 hours.  You will be more sensitive to light and vision will be blurry up close.  Mydriatic sunglasses were given if needed.    Nitin Jacobson, ANIBAL  [facility]  [place]  [address]    [phone]

## 2020-09-14 NOTE — LETTER
"    9/14/2020         RE: David Ng  [address] [place] [address]        Dear Colleague,    Thank you for referring your patient, David Ng, to the [facility]. Please see a copy of my visit note below.    Chief Complaint   Patient presents with     Annual Eye Exam     Patient states she is pre-diabetic       Hemoglobin A1C   Date Value Ref Range Status   12/26/2019 6.6 (H) 0 - 5.6 % Final     Comment:     Normal <5.7% Prediabetes 5.7-6.4%  Diabetes 6.5% or higher - adopted from ADA   consensus guidelines.     03/20/2019 6.4 (H) 0 - 5.6 % Final     Comment:     Normal <5.7% Prediabetes 5.7-6.4%  Diabetes 6.5% or higher - adopted from ADA   consensus guidelines.     02/05/2018 6.4 (H) 4.3 - 6.0 % Final         Last Eye Exam: 7/2017  Dilated Previously: Yes    What are you currently using to see?  Glasses - progressive  Distance Vision Acuity: Satisfied with vision    Near Vision Acuity: Satisfied with vision while reading  with glasses    Eye Comfort: good  Do you use eye drops? : No  Occupation or Hobbies: retired    [employer]    **Patient is no longer experiencing the \"red spots\" in her vision. She states they gradually resolved and hasn't seen them for a couple of months now. Patient had MRI in December 2019 yielding normal results. Normal carotid ultrasound.        Medical, surgical and family histories reviewed and updated 9/14/2020.       OBJECTIVE: See Ophthalmology exam    ASSESSMENT:    ICD-10-CM    1. Encounter for examination of eyes and vision with abnormal findings  Z01.01    2. Combined forms of age-related cataract of both eyes  H25.813    3. Mild nonproliferative diabetic retinopathy of both eyes without macular edema associated with type 2 diabetes mellitus (H)  E11.3293    4. Myopia of both eyes  H52.13    5. Regular astigmatism of both eyes  H52.223    6. Presbyopia  H52.4       PLAN:    David Ng aware  eye exam results will be " sent to Jayden Munoz  Patient Instructions   Updated glasses prescription provided today. Optional to fill- mild change to power.     You have the formation of cataracts.  You may notice some blurred vision or glare with night driving.  It is important that you wear good sunglasses to protect your eyes from the ultraviolet light from the sun.     Patient educated on importance of good blood sugar control.  Letter sent to primary care provider with diabetic eye exam report.     Return in 1 year for comprehensive eye exam, or sooner if needed.     The effects of the dilating drops last for 4- 6 hours.  You will be more sensitive to light and vision will be blurry up close.  Mydriatic sunglasses were given if needed.    Nitin Jacobson OD  [address] [place]  [address] [phone]               Again, thank you for allowing me to participate in the care of your patient.        Sincerely,        Nitin Jacobson OD

## 2020-09-18 ENCOUNTER — OFFICE VISIT (OUTPATIENT)
Dept: FAMILY MEDICINE | Facility: CLINIC | Age: 85
End: 2020-09-18
Payer: COMMERCIAL

## 2020-09-18 VITALS
HEART RATE: 82 BPM | OXYGEN SATURATION: 97 % | DIASTOLIC BLOOD PRESSURE: 58 MMHG | TEMPERATURE: 98.5 F | WEIGHT: 145.38 LBS | BODY MASS INDEX: 28.39 KG/M2 | SYSTOLIC BLOOD PRESSURE: 134 MMHG

## 2020-09-18 DIAGNOSIS — R20.2 NUMBNESS AND TINGLING IN LEFT HAND: ICD-10-CM

## 2020-09-18 DIAGNOSIS — R20.0 NUMBNESS AND TINGLING IN LEFT HAND: ICD-10-CM

## 2020-09-18 DIAGNOSIS — M79.10 MUSCLE PAIN: ICD-10-CM

## 2020-09-18 DIAGNOSIS — R53.83 FATIGUE, UNSPECIFIED TYPE: ICD-10-CM

## 2020-09-18 DIAGNOSIS — K21.9 GASTROESOPHAGEAL REFLUX DISEASE, ESOPHAGITIS PRESENCE NOT SPECIFIED: ICD-10-CM

## 2020-09-18 DIAGNOSIS — I10 HYPERTENSION GOAL BP (BLOOD PRESSURE) < 140/90: ICD-10-CM

## 2020-09-18 DIAGNOSIS — E78.5 HYPERLIPIDEMIA LDL GOAL <100: ICD-10-CM

## 2020-09-18 DIAGNOSIS — E11.9 TYPE 2 DIABETES MELLITUS WITHOUT COMPLICATION, WITHOUT LONG-TERM CURRENT USE OF INSULIN (H): Primary | ICD-10-CM

## 2020-09-18 LAB
ALBUMIN SERPL-MCNC: 3.8 G/DL (ref 3.4–5)
ALP SERPL-CCNC: 77 U/L (ref 40–150)
ALT SERPL W P-5'-P-CCNC: 33 U/L (ref 0–50)
ANION GAP SERPL CALCULATED.3IONS-SCNC: 7 MMOL/L (ref 3–14)
AST SERPL W P-5'-P-CCNC: 24 U/L (ref 0–45)
BASOPHILS # BLD AUTO: 0 10E9/L (ref 0–0.2)
BASOPHILS NFR BLD AUTO: 0.3 %
BILIRUB SERPL-MCNC: 0.5 MG/DL (ref 0.2–1.3)
BUN SERPL-MCNC: 17 MG/DL (ref 7–30)
CALCIUM SERPL-MCNC: 9.4 MG/DL (ref 8.5–10.1)
CHLORIDE SERPL-SCNC: 104 MMOL/L (ref 94–109)
CHOLEST SERPL-MCNC: 199 MG/DL
CO2 SERPL-SCNC: 28 MMOL/L (ref 20–32)
CREAT SERPL-MCNC: 0.56 MG/DL (ref 0.52–1.04)
CREAT UR-MCNC: 82 MG/DL
DIFFERENTIAL METHOD BLD: ABNORMAL
EOSINOPHIL # BLD AUTO: 0.1 10E9/L (ref 0–0.7)
EOSINOPHIL NFR BLD AUTO: 1.2 %
ERYTHROCYTE [DISTWIDTH] IN BLOOD BY AUTOMATED COUNT: 18.1 % (ref 10–15)
GFR SERPL CREATININE-BSD FRML MDRD: 84 ML/MIN/{1.73_M2}
GLUCOSE SERPL-MCNC: 115 MG/DL (ref 70–99)
HBA1C MFR BLD: 6.7 % (ref 0–5.6)
HCT VFR BLD AUTO: 39.4 % (ref 35–47)
HDLC SERPL-MCNC: 58 MG/DL
HGB BLD-MCNC: 13.1 G/DL (ref 11.7–15.7)
LDLC SERPL CALC-MCNC: 106 MG/DL
LYMPHOCYTES # BLD AUTO: 2.6 10E9/L (ref 0.8–5.3)
LYMPHOCYTES NFR BLD AUTO: 34.8 %
MCH RBC QN AUTO: 21.6 PG (ref 26.5–33)
MCHC RBC AUTO-ENTMCNC: 33.2 G/DL (ref 31.5–36.5)
MCV RBC AUTO: 65 FL (ref 78–100)
MICROALBUMIN UR-MCNC: 12 MG/L
MICROALBUMIN/CREAT UR: 15.07 MG/G CR (ref 0–25)
MONOCYTES # BLD AUTO: 0.6 10E9/L (ref 0–1.3)
MONOCYTES NFR BLD AUTO: 8.2 %
NEUTROPHILS # BLD AUTO: 4.2 10E9/L (ref 1.6–8.3)
NEUTROPHILS NFR BLD AUTO: 55.5 %
NONHDLC SERPL-MCNC: 141 MG/DL
PLATELET # BLD AUTO: 324 10E9/L (ref 150–450)
POTASSIUM SERPL-SCNC: 3.7 MMOL/L (ref 3.4–5.3)
PROT SERPL-MCNC: 8.2 G/DL (ref 6.8–8.8)
RBC # BLD AUTO: 6.07 10E12/L (ref 3.8–5.2)
SODIUM SERPL-SCNC: 139 MMOL/L (ref 133–144)
TRIGL SERPL-MCNC: 177 MG/DL
TSH SERPL DL<=0.005 MIU/L-ACNC: 3.44 MU/L (ref 0.4–4)
WBC # BLD AUTO: 7.6 10E9/L (ref 4–11)

## 2020-09-18 PROCEDURE — 99214 OFFICE O/P EST MOD 30 MIN: CPT | Performed by: FAMILY MEDICINE

## 2020-09-18 PROCEDURE — 85025 COMPLETE CBC W/AUTO DIFF WBC: CPT | Performed by: FAMILY MEDICINE

## 2020-09-18 PROCEDURE — 82043 UR ALBUMIN QUANTITATIVE: CPT | Performed by: FAMILY MEDICINE

## 2020-09-18 PROCEDURE — 80061 LIPID PANEL: CPT | Performed by: FAMILY MEDICINE

## 2020-09-18 PROCEDURE — 80053 COMPREHEN METABOLIC PANEL: CPT | Performed by: FAMILY MEDICINE

## 2020-09-18 PROCEDURE — 84443 ASSAY THYROID STIM HORMONE: CPT | Performed by: FAMILY MEDICINE

## 2020-09-18 PROCEDURE — 83036 HEMOGLOBIN GLYCOSYLATED A1C: CPT | Performed by: FAMILY MEDICINE

## 2020-09-18 PROCEDURE — 36415 COLL VENOUS BLD VENIPUNCTURE: CPT | Performed by: FAMILY MEDICINE

## 2020-09-18 RX ORDER — LOSARTAN POTASSIUM AND HYDROCHLOROTHIAZIDE 25; 100 MG/1; MG/1
TABLET ORAL
Qty: 90 TABLET | Refills: 1 | Status: SHIPPED | OUTPATIENT
Start: 2020-09-18 | End: 2021-05-04

## 2020-09-18 RX ORDER — CYCLOBENZAPRINE HCL 10 MG
10 TABLET ORAL 3 TIMES DAILY PRN
Qty: 60 TABLET | Refills: 0 | Status: SHIPPED | OUTPATIENT
Start: 2020-09-18

## 2020-09-18 RX ORDER — METOPROLOL SUCCINATE 200 MG/1
TABLET, EXTENDED RELEASE ORAL
Qty: 90 TABLET | Refills: 1 | Status: SHIPPED | OUTPATIENT
Start: 2020-09-18

## 2020-09-18 RX ORDER — CYCLOBENZAPRINE HCL 5 MG
5 TABLET ORAL 3 TIMES DAILY PRN
Qty: 30 TABLET | Refills: 1 | Status: CANCELLED | OUTPATIENT
Start: 2020-09-18

## 2020-09-18 RX ORDER — AMLODIPINE BESYLATE 10 MG/1
TABLET ORAL
Qty: 90 TABLET | Refills: 1 | Status: SHIPPED | OUTPATIENT
Start: 2020-09-18

## 2020-09-18 RX ORDER — METFORMIN HYDROCHLORIDE 750 MG/1
TABLET, EXTENDED RELEASE ORAL
Qty: 180 TABLET | Refills: 1 | Status: SHIPPED | OUTPATIENT
Start: 2020-09-18

## 2020-09-18 RX ORDER — FAMOTIDINE 20 MG/1
20 TABLET, FILM COATED ORAL 2 TIMES DAILY
Qty: 180 TABLET | Refills: 1 | Status: SHIPPED | OUTPATIENT
Start: 2020-09-18

## 2020-09-18 ASSESSMENT — PAIN SCALES - GENERAL: PAINLEVEL: NO PAIN (0)

## 2020-09-18 NOTE — PROGRESS NOTES
Subjective     David Ng is a 86 year old female who presents to clinic today for the following health issues:    HPI       Left hand numbness for the past month  Renew cyclobenzaprine      Review of Systems   Want the 10 mg cyclobenz, not 5  Does not make her tired at 10 mg    The hand numbness was gradual   No pain   No trauma    Started with thumb, thumb okay now    Sometimes goes up  Arm,  Not  Much    Numbness worse when sleeping    Often sleeps  On right side    Or when  Holding phone    Also happens when active    When patient shakes hand, goes away    Not exercising    Mostly staying at home      Not going out much    Sometimes  Goes to store    No chest pain/ breathing problems    Patient not taking the isosorbide and she does not want to             Objective    BP (!) 144/81 (BP Location: Right arm, Patient Position: Chair, Cuff Size: Adult Regular)   Pulse 82   Temp 98.5  F (36.9  C) (Oral)   Wt 65.9 kg (145 lb 6 oz)   SpO2 97%   Breastfeeding No   BMI 28.39 kg/m    Body mass index is 28.39 kg/m .  Physical Exam  Constitutional:       Appearance: She is well-developed.   HENT:      Head: Normocephalic and atraumatic.   Eyes:      Conjunctiva/sclera: Conjunctivae normal.   Neck:      Vascular: No carotid bruit.   Cardiovascular:      Rate and Rhythm: Normal rate and regular rhythm.      Heart sounds: Normal heart sounds.   Pulmonary:      Effort: Pulmonary effort is normal. No respiratory distress.      Breath sounds: Normal breath sounds.   Neurological:      Mental Status: She is alert and oriented to person, place, and time.         good sensation and strength in both hands and wrists    Good radial pulses     Range of motion fine    Neck range of motion okay , no radiculopathy    Negative phalens/ tinels/ pressure test        Labs pending           ASSESSMENT / PLAN:  (E11.9) Type 2 diabetes mellitus without complication, without long-term current use of insulin (H)  (primary encounter  diagnosis)  Comment: recheck lab. Hemoglobin a1c has been very steady in the 6s the last  Few years.  Rf med   Plan: Albumin Random Urine Quantitative with Creat         Ratio, Hemoglobin A1c, metFORMIN         (GLUCOPHAGE-XR) 750 MG 24 hr tablet             (R20.0,  R20.2) Numbness and tingling in left hand  Comment: exam is very reassuring here.  May be transient blood vessel or nerve compression.  Doubt  Anginal as she had totally normal cardiolite earlier this year  And symptoms often most bothersome at night.  Plan: advised trial of over the counter velcro wrist splint to use at night    (M79.10) Muscle pain  Comment: patient wants the 10  Mg not 5   Plan: cyclobenzaprine (FLEXERIL) 10 MG tablet        Use  Prn sparingly      (K21.9) Gastroesophageal reflux disease, esophagitis presence not specified  Comment: stable, needs refill    Plan: famotidine (PEPCID) 20 MG tablet             (I10) Hypertension goal BP (blood pressure) < 140/90  Comment: on recheck blood pressure okay. Wide pulse pressure but  Not unexpected with age.  She is not on isosorbide and does not want that   Plan: amLODIPine (NORVASC) 10 MG tablet,         losartan-hydrochlorothiazide (HYZAAR) 100-25 MG        tablet, metoprolol succinate ER (TOPROL-XL) 200        MG 24 hr tablet             (E78.5) Hyperlipidemia LDL goal <100  Comment: check fasting   Plan: Lipid panel reflex to direct LDL Fasting,         Comprehensive metabolic panel             (R53.83) Fatigue, unspecified type  Comment: check   Plan: TSH with free T4 reflex, CBC with platelets         differential                I reviewed the patient's medications, allergies, medical history, family history, and social history.    Jayden Munoz MD

## 2020-09-18 NOTE — PATIENT INSTRUCTIONS
Use small velcro wrist splint from drugstore; use at night    Increase walking/  Activity as able    Stay on same meds    10 mg cyclobenzaprine, not 5 ; use sparingly as needed    We will send you lab results

## 2020-09-20 NOTE — RESULT ENCOUNTER NOTE
Diabetes test ( hemoglobin a1c ) is fine.    Cholesterol is better than last year.    Other labs are good.    Jayden Munoz MD

## 2020-11-02 ENCOUNTER — TELEPHONE (OUTPATIENT)
Dept: FAMILY MEDICINE | Facility: CLINIC | Age: 85
End: 2020-11-02

## 2020-11-02 DIAGNOSIS — R05.9 COUGH: Primary | ICD-10-CM

## 2020-11-02 NOTE — TELEPHONE ENCOUNTER
Attempt # 1  Called patient at home number.  Mobile [phone]        Was call answered?  Yes,  Cough started Saturday.  May have been around someone who had Covid - about 3 or 4 days ago. Friend who visited has symptoms but has not been tested yet.  Nurse advised rest, fluids nutritious foods. Explained the Covid testing.    Routing to PCP to review and advise.    Brittany Moody RN  [facility]        Do you have:     Fever >100.0  NO  New cough  YES  Shortness of breath  NO    Chills  NO    New loss of taste or smell  NO    Generalized body aches  NO    New persistent headache  NO    New sore throat  YES  New rash  NO    Nausea, vomiting or diarrhea  NO       Within the past 3 weeks, have you been exposed to someone with a known positive COVID 19 test?  Possibly exposed  Have you traveled anywhere?  NO                  Brittany SAUCEDO [facility]

## 2020-11-02 NOTE — TELEPHONE ENCOUNTER
Attempt # 1  Called patient at  Mobile [phone]     .  Was call answered?  Yes, informed of covid testing order entered, explained call will not show up on caller ID. Patient verbalized understanding and agreement with plan and had no questions.              Brittany Moody RN  [facility]

## 2020-11-02 NOTE — TELEPHONE ENCOUNTER
Reason for call:  Patient reporting a symptom     Symptom or request: Cough    Duration (how long have symptoms been present): 2 Days     Have you been treated for this before? No    Additional comments: Pt's son would like a ronnie back to discusss.    Phone Number patient can be reached at:  Cell number on file:    Telephone Information:   Mobile [phone]       Best Time:  Anytime    Can we leave a detailed message on this number:  NO    Call taken on 11/2/2020 at 10:09 AM by Niki Gatica

## 2020-11-03 ENCOUNTER — NURSE TRIAGE (OUTPATIENT)
Dept: NURSING | Facility: CLINIC | Age: 85
End: 2020-11-03

## 2020-11-03 NOTE — TELEPHONE ENCOUNTER
Son Elbert requests to schedule a COVID test for [facility]. He agreed to be connected to a .    Shamika Bartlett RN/[facility] Nurse Advisor        Additional Information    Question about upcoming scheduled test, no triage required and triager able to answer question    Protocols used: INFORMATION ONLY CALL-A-AH

## 2020-11-04 DIAGNOSIS — R05.9 COUGH: ICD-10-CM

## 2020-11-04 PROCEDURE — U0003 INFECTIOUS AGENT DETECTION BY NUCLEIC ACID (DNA OR RNA); SEVERE ACUTE RESPIRATORY SYNDROME CORONAVIRUS 2 (SARS-COV-2) (CORONAVIRUS DISEASE [COVID-19]), AMPLIFIED PROBE TECHNIQUE, MAKING USE OF HIGH THROUGHPUT TECHNOLOGIES AS DESCRIBED BY CMS-2020-01-R: HCPCS | Performed by: FAMILY MEDICINE

## 2020-11-05 ENCOUNTER — TELEPHONE (OUTPATIENT)
Dept: FAMILY MEDICINE | Facility: CLINIC | Age: 85
End: 2020-11-05

## 2020-11-05 ENCOUNTER — NURSE TRIAGE (OUTPATIENT)
Dept: NURSING | Facility: CLINIC | Age: 85
End: 2020-11-05

## 2020-11-05 LAB
SARS-COV-2 RNA SPEC QL NAA+PROBE: ABNORMAL
SPECIMEN SOURCE: ABNORMAL

## 2020-11-05 NOTE — TELEPHONE ENCOUNTER
"Son ANA LAURA is calling for covid results with mom Hernandez.    Coronavirus (COVID-19) Notification    Caller Name (Patient, parent, daughter/son, grandparent, etc)  Ana Laura Ng (son)    Reason for call  Notify of Positive Coronavirus (COVID-19) lab results, assess symptoms,  review [facility] recommendations    Lab Result    Lab test:  2019-nCoV rRt-PCR or SARS-CoV-2 PCR    Oropharyngeal AND/OR nasopharyngeal swabs is POSITIVE for 2019-nCoV RNA/SARS-COV-2 PCR (COVID-19 virus)    RN Recommendations/Instructions per [facility] Coronavirus COVID-19 recommendations    Brief introduction script  Introduce self then review script:  \"I am calling on behalf of [employer].  We were notified that your Coronavirus test (COVID-19) for was POSITIVE for the virus.  I have some information to relay to you but first I wanted to mention that the [place] Dept of Health will be contacting you shortly [it's possible MD already called Patient] to talk to you more about how you are feeling and other people you have had contact with who might now also have the virus.  Also, [facility] is Partnering with the [facility] for Covid-19 research, you may be contacted directly by research staff.\"    Assessment (Inquire about Patient's current symptoms)   Assessment   Current Symptoms at time of phone call: (if no symptoms, document No symptoms] cough   Symptoms onset (if applicable)      If at time of call, Patients symptoms hare worsened, the Patient should contact 911 or have someone drive them to Emergency Dept promptly:      If Patient calling 911, inform 911 personal that you have tested positive for the Coronavirus (COVID-19).  Place mask on and await 911 to arrive.    If Emergency Dept, If possible, please have another adult drive you to the Emergency Dept but you need to wear mask when in contact with other people.      Review information with Patient    Your result was positive. This means you have COVID-19 " (coronavirus).  We have sent you a letter that reviews the information that I'll be reviewing with you now.    How can I protect others?    If you have symptoms: stay home and away from others (self-isolate) until:    You've had no fever--and no medicine that reduces fever--for 1 full day (24 hours). And       Your other symptoms have gotten better. For example, your cough or breathing has improved. And     At least 10 days have passed since your symptoms started. (If you've been told by a doctor that you have a weak immune system, wait 20 days.)     If you don't have symptoms: Stay home and away from others (self-isolate) until at least 10 days have passed since your first positive COVID-19 test. (Date test collected)    During this time:    Stay in your own room, including for meals. Use your own bathroom if you can.    Stay away from others in your home. No hugging, kissing or shaking hands. No visitors.     Don't go to work, school or anywhere else.     Clean  high touch  surfaces often (doorknobs, counters, handles, etc.). Use a household cleaning spray or wipes. You'll find a full list on the EPA website at www.epa.gov/pesticide-registration/list-n-disinfectants-use-against-sars-cov-2.     Cover your mouth and nose with a mask, tissue or other face covering to avoid spreading germs.    Wash your hands and face often with soap and water.    Caregivers in these groups are at risk for severe illness due to COVID-19:  o People 65 years and older  o People who live in a nursing home or long-term care facility  o People with chronic disease (lung, heart, cancer, diabetes, kidney, liver, immunologic)  o People who have a weakened immune system, including those who:  - Are in cancer treatment  - Take medicine that weakens the immune system, such as corticosteroids  - Had a bone marrow or organ transplant  - Have an immune deficiency  - Have poorly controlled HIV or AIDS  - Are obese (body mass index of 40 or  higher)  - Smoke regularly    Caregivers should wear gloves while washing dishes, handling laundry and cleaning bedrooms and bathrooms.    Wash and dry laundry with special caution. Don't shake dirty laundry, and use the warmest water setting you can.    If you have a weakened immune system, ask your doctor about other actions you should take.    For more tips, go to www.cdc.gov/coronavirus/2019-ncov/downloads/10Things.pdf.    You should not go back to work until you meet the guidelines above for ending your home isolation. You don't need to be retested for COVID-19 before going back to work--studies show that you won't spread the virus if it's been at least 10 days since your symptoms started (or 20 days, if you have a weak immune system).    Employers: This document serves as formal notice of your employee's medical guidelines for going back to work. They must meet the above guidelines before going back to work in person.    How can I take care of myself?    1. Get lots of rest. Drink extra fluids (unless a doctor has told you not to).    2. Take Tylenol (acetaminophen) for fever or pain. If you have liver or kidney problems, ask your family doctor if it's okay to take Tylenol.     Take either:     650 mg (two 325 mg pills) every 4 to 6 hours, or     1,000 mg (two 500 mg pills) every 8 hours as needed.     Note: Don't take more than 3,000 mg in one day. Acetaminophen is found in many medicines (both prescribed and over-the-counter medicines). Read all labels to be sure you don't take too much.    For children, check the Tylenol bottle for the right dose (based on their age or weight).    3. If you have other health problems (like cancer, heart failure, an organ transplant or severe kidney disease): Call your specialty clinic if you don't feel better in the next 2 days.    4. Know when to call 911: Emergency warning signs include:    Trouble breathing or shortness of breath    Pain or pressure in the chest that  doesn't go away    Feeling confused like you haven't felt before, or not being able to wake up    Bluish-colored lips or face    5. Sign up for ApplyMap. We know it's scary to hear that you have COVID-19. We want to track your symptoms to make sure you're okay over the next 2 weeks. Please look for an email from [employer]Map--this is a free, online program that we'll use to keep in touch. To sign up, follow the link in the email. Learn more at [email or web address].    Where can I get more information?    [facility]: [email or web address]    Coronavirus Basics: www.health.[facility].[place]/diseases/coronavirus/basics.html    What to Do If You're Sick: www.cdc.gov/coronavirus/2019-ncov/about/steps-when-sick.html    Ending Home Isolation: www.cdc.gov/coronavirus/2019-ncov/hcp/disposition-in-home-patients.html     Caring for Someone with COVID-19: www.cdc.gov/coronavirus/2019-ncov/if-you-are-sick/care-for-someone.html     [facility] clinical trials (COVID-19 research studies): [email or web address]-[email or web address]-[email or web address]     A Positive COVID-19 letter will be sent via [employer] or the mail. (Exception, no letters sent to Presurgerical/Preprocedure Patients)    [Name]  Mojgan Reyes RN/BRITTA       Reason for Disposition    [1] COVID-19 diagnosed by positive lab test AND [2] mild symptoms (e.g., cough, fever, others) AND [3] no complications or SOB    Additional Information    Negative: SEVERE difficulty breathing (e.g., struggling for each breath, speaks in single words)    Negative: Difficult to awaken or acting confused (e.g., disoriented, slurred speech)    Negative: Bluish (or gray) lips or face now    Negative: Shock suspected (e.g., cold/pale/clammy skin, too weak to stand, low BP, rapid pulse)    Negative: Sounds like a life-threatening emergency to the triager    Negative: SEVERE or constant chest pain or pressure (Exception: mild central chest pain, present only when coughing)     Negative: MODERATE difficulty breathing (e.g., speaks in phrases, SOB even at rest, pulse 100-120)    Negative: Patient sounds very sick or weak to the triager    Negative: MILD difficulty breathing (e.g., minimal/no SOB at rest, SOB with walking, pulse <100)    Negative: Chest pain or pressure    Negative: Fever > 103 F (39.4 C)    Negative: [1] Fever > 101 F (38.3 C) AND [2] age > 60    Negative: [1] Fever > 100.0 F (37.8 C) AND [2] bedridden (e.g., nursing home patient, CVA, chronic illness, recovering from surgery)    Negative: HIGH RISK patient (e.g., age > 64 years, diabetes, heart or lung disease, weak immune system) (Exception: Has already been evaluated by healthcare provider and has no new or worsening symptoms)    Negative: Fever present > 3 days (72 hours)    Negative: [1] Fever returns after gone for over 24 hours AND [2] symptoms worse or not improved    Negative: [1] Continuous (nonstop) coughing interferes with work or school AND [2] no improvement using cough treatment per protocol    Negative: [1] COVID-19 infection suspected by caller or triager AND [2] mild symptoms (cough, fever, or others) AND [3] no complications or SOB    Negative: Cough present > 3 weeks    Protocols used: CORONAVIRUS (COVID-19) DIAGNOSED OR [id number] 8.4.20

## 2020-11-06 NOTE — TELEPHONE ENCOUNTER
I called to make sure patient got pos covid result  She did    Feels okay, at home with son  Discussed quarantining at home     If symptoms bad, then to emergency room    Jayden Munoz MD

## 2020-11-07 ENCOUNTER — MYC MEDICAL ADVICE (OUTPATIENT)
Dept: FAMILY MEDICINE | Facility: CLINIC | Age: 85
End: 2020-11-07

## 2020-11-09 ENCOUNTER — TRANSFERRED RECORDS (OUTPATIENT)
Dept: HEALTH INFORMATION MANAGEMENT | Facility: CLINIC | Age: 85
End: 2020-11-09

## 2020-11-09 NOTE — TELEPHONE ENCOUNTER
Patient's son Elbert called and asked if there is any way Dr. Munoz could respond via Dialogfeed or call him back at: [phone]. Patient needs relief from the cough she is having because she hasn't been sleeping for a few days. Please see Dialogfeed message below.    Thank you  Marisol Hernandez  Patient Representative

## 2020-11-16 ENCOUNTER — PATIENT OUTREACH (OUTPATIENT)
Dept: NURSING | Facility: CLINIC | Age: 85
End: 2020-11-16
Payer: COMMERCIAL

## 2020-11-16 DIAGNOSIS — U07.1 2019 NOVEL CORONAVIRUS DISEASE (COVID-19): Primary | ICD-10-CM

## 2020-11-16 NOTE — PROGRESS NOTES
Clinic Care Coordination Contact  Care Team Conversations    The RN CC nurse care coordinator spoke with the son of the patient.  The son of the patient answered and provided information.  The patient is doing very well.  The son denies that he mother complains of chest pain, cough, fever, or pain.  They decline to have care coordination at this time.      Care Coordination to remain available for the patient to contact in the event of future needs. No follow up planned at this time.         Isidro Huerta MSN, RN, PHN, [facility]   Primary Care Clinical RN Care Coordinator  [facility]  11/16/2020   9:47 AM  [email or web address]  Office: [phone]

## 2020-11-16 NOTE — LETTER
[place] CARE COORDINATION   [address]  [place] [address]    November 16, 2020    David Ng  [address] [place] [address]      Dear David,    I am a clinic care coordinator who works with Jayden Munoz MD at [facility]. I wanted to thank you for spending the time to talk with me.  Below is a description of clinic care coordination and how I can further assist you.      The clinic care coordination team is made up of a registered nurse,  and community health worker who understand the health care system. The goal of clinic care coordination is to help you manage your health and improve access to the health care system in the most efficient manner. The team can assist you in meeting your health care goals by providing education, coordinating services, strengthening the communication among your providers and supporting you with any resource needs.    Please feel free to contact me at [phone] with any questions or concerns. We are focused on providing you with the highest-quality healthcare experience possible and that all starts with you.     Sincerely,     Isidro Huerta MSN, RN, PHN, CCM   Primary Care Clinical RN Care Coordinator  [facility]  11/16/2020   9:09 AM  [email or web address]  Office: [phone]

## 2020-11-18 ENCOUNTER — TELEPHONE (OUTPATIENT)
Dept: CARDIOLOGY | Facility: CLINIC | Age: 85
End: 2020-11-18

## 2020-12-21 DIAGNOSIS — Z11.9 SCREENING EXAMINATION FOR INFECTIOUS DISEASE: ICD-10-CM

## 2020-12-21 PROCEDURE — U0003 INFECTIOUS AGENT DETECTION BY NUCLEIC ACID (DNA OR RNA); SEVERE ACUTE RESPIRATORY SYNDROME CORONAVIRUS 2 (SARS-COV-2) (CORONAVIRUS DISEASE [COVID-19]), AMPLIFIED PROBE TECHNIQUE, MAKING USE OF HIGH THROUGHPUT TECHNOLOGIES AS DESCRIBED BY CMS-2020-01-R: HCPCS | Performed by: FAMILY MEDICINE

## 2020-12-21 NOTE — LETTER
December 23, 2020      David Ng  [address] [place] [address]        Dear Ms.Darrynissa,    We are writing to inform you of your test results.    Negative covid      Resulted Orders   Asymptomatic COVID-19 Virus (Coronavirus) by PCR   Result Value Ref Range    COVID-19 Virus PCR to [facility] - Source Nasopharyngeal     COVID-19 Virus PCR to [facility] - Result Not Detected       Comment:      Collection of multiple specimens from the same patient may be necessary to   detect the virus. The possibility of a false negative should be considered if   the patient's recent exposure or clinical presentation suggests 2019 nCOV   infection and diagnostic tests for other causes of illness are negative.   Repeat testing may be considered in this setting.  Patient sample was heat inactivated and amplified using the HDPCR SARS-CoV-2   assay ([employer] The HDPCRTM SARS-CoV-2 assay is a reverse   transcription real-time polymerase chain reaction (qRT-PCR) test intended for   the qualitative detection of nucleic acid  from SARS-CoV-2 in human nasopharyngeal swabs, oropharyngeal swabs, anterior   nasal swabs, mid-turbinate nasal swabs as well as nasal aspirate, nasal wash,   and bronchoalveolar lavage (BAL) specimens from individuals who are suspected   of COVID-19 by their healthcare provider.  A negative result does not rule out the presence of real-time PCR inhibitors   in the sp ecimen or COVID-19 RNA in concentrations below the limit of detection   of the assay. The possibility of a false negative should be considered if the   patients recent exposure or clinical presentation suggests COVID-19.   Additional testing or repeat testing requires consultation with the   laboratory.  Nasopharyngeal specimen is the preferred choice for swab-based SARS CoV2   testing. When collection of a nasopharyngeal swab is not possible the   following are acceptable alternatives:  an oropharyngeal (OP) specimen collected by a  healthcare professional, or a   nasal mid-turbinate (NMT) swab collected by a healthcare professional or by   onsite self-collection (using a flocked tapered swab), or an anterior nares   specimen collected by a healthcare professional or by onsite self-collection   (using a round foam swab). (Centers for Disease Control)  Testing performed by [facility] ([facility]) [address] [facility] [address]  The test performance characteristics were determined by [facility]. It has not been   cleared or approved by the FDA.  The laboratory is regulated under the Clinical Laboratory Improvement   Amendments of 1988 (CLIA-88) as qualified to perform high-complexity testing.   This test is used for clinical purposes. It should not be regarded as   investigational or for research.         If you have any questions or concerns, please call the clinic at the number listed above.       Sincerely,      Jayden Munoz MD/elieser

## 2020-12-22 LAB
SARS-COV-2 RNA SPEC QL NAA+PROBE: NOT DETECTED
SPECIMEN SOURCE: NORMAL

## 2021-02-01 DIAGNOSIS — E11.9 TYPE 2 DIABETES MELLITUS WITHOUT COMPLICATION, WITHOUT LONG-TERM CURRENT USE OF INSULIN (H): ICD-10-CM

## 2021-02-01 DIAGNOSIS — I10 HYPERTENSION GOAL BP (BLOOD PRESSURE) < 140/90: ICD-10-CM

## 2021-02-01 DIAGNOSIS — K21.9 GASTROESOPHAGEAL REFLUX DISEASE: ICD-10-CM

## 2021-02-03 NOTE — TELEPHONE ENCOUNTER
Please call patient she was seen through [facility] for annual exam on 1/12/21, is this her new PCP or will she continue to see Dr. Munoz?  Dianelys Travis RN

## 2021-02-06 RX ORDER — AMLODIPINE BESYLATE 10 MG/1
TABLET ORAL
Qty: 90 TABLET | Refills: 1 | OUTPATIENT
Start: 2021-02-06

## 2021-02-06 RX ORDER — FAMOTIDINE 20 MG/1
TABLET, FILM COATED ORAL
Qty: 180 TABLET | Refills: 1 | OUTPATIENT
Start: 2021-02-06

## 2021-02-06 RX ORDER — METFORMIN HYDROCHLORIDE 750 MG/1
TABLET, EXTENDED RELEASE ORAL
Qty: 180 TABLET | Refills: 1 | OUTPATIENT
Start: 2021-02-06

## 2021-02-19 ENCOUNTER — TELEPHONE (OUTPATIENT)
Dept: CARDIOLOGY | Facility: CLINIC | Age: 86
End: 2021-02-19

## 2021-02-19 NOTE — TELEPHONE ENCOUNTER
Pt needs to schedule a follow up with Dr. Mckay for around August 24th 2021.    Please link follow up request.

## 2021-03-22 DIAGNOSIS — E78.5 HYPERLIPIDEMIA LDL GOAL <100: ICD-10-CM

## 2021-03-25 RX ORDER — PRAVASTATIN SODIUM 40 MG
TABLET ORAL
Qty: 90 TABLET | Refills: 0 | Status: SHIPPED | OUTPATIENT
Start: 2021-03-25

## 2021-03-25 NOTE — TELEPHONE ENCOUNTER
Last Written Prescription Date:  11/15/20  Last Fill Quantity: 90,  # refills: 1   Last office visit: 9/18/20 with prescribing provider:  Dr. Munoz   Future Office Visit: none    Prescription approved per [facility] Refill Protocol.    Charlotte Marte, RN, BSN  [facility]

## 2021-04-11 ENCOUNTER — HEALTH MAINTENANCE LETTER (OUTPATIENT)
Age: 86
End: 2021-04-11

## 2021-05-27 RX ORDER — ISOSORBIDE MONONITRATE 30 MG/1
TABLET, EXTENDED RELEASE ORAL
Qty: 90 TABLET | OUTPATIENT
Start: 2021-05-27

## 2021-05-27 NOTE — TELEPHONE ENCOUNTER
ISOSORBIDE MONONITRATE ER 30 MG Tablet Extended Release 24 Hour    isosorbide mononitrate (IMDUR) 30 MG 24 hr tablet (Discontinued) 90 tablet 3 8/26/2020 9/18/2020 No   Sig - Route: Take 1 tablet (30 mg) by mouth daily - Oral   Sent to pharmacy as: Isosorbide Mononitrate ER 30 MG Oral Tablet Extended Release 24 Hour (IMDUR)   Class: E-Prescribe   Order: [id number]   E-Prescribing Status: Receipt confirmed by pharmacy (8/26/2020  3:24 PM CDT)   Printout Tracking    External Result Report   Pharmacy    [facility] - [facility] [address]   This Order Has Been Discontinued    Order Status Reason By On   Discontinued None Deal, Jayden HEBERT MD 9/18/20 5811           Berna Bustos RN  Central Triage Red Flags/Med Refills

## 2021-05-31 DIAGNOSIS — E78.5 HYPERLIPIDEMIA LDL GOAL <100: ICD-10-CM

## 2021-06-03 RX ORDER — PRAVASTATIN SODIUM 40 MG
TABLET ORAL
Qty: 90 TABLET | Refills: 0 | OUTPATIENT
Start: 2021-06-03

## 2021-09-20 DIAGNOSIS — I10 HYPERTENSION GOAL BP (BLOOD PRESSURE) < 140/90: ICD-10-CM

## 2021-09-22 RX ORDER — LOSARTAN POTASSIUM AND HYDROCHLOROTHIAZIDE 25; 100 MG/1; MG/1
TABLET ORAL
Qty: 90 TABLET | Refills: 0 | Status: SHIPPED | OUTPATIENT
Start: 2021-09-22

## 2021-09-22 NOTE — TELEPHONE ENCOUNTER
Okay refill but see us in clinic at [facility] in the next couple months    Please inform patient    Jayden Munoz MD

## 2021-09-22 NOTE — TELEPHONE ENCOUNTER
"Requested Prescriptions   Pending Prescriptions Disp Refills     losartan-hydrochlorothiazide (HYZAAR) 100-25 MG tablet [Pharmacy Med Name: LOSARTAN POTASSIUM/HYDROCHLOROTHIAZIDE 100-25 MG Tablet] 90 tablet 1     Sig: TAKE 1 TABLET EVERY DAY       Angiotensin-II Receptors Failed - 9/20/2021  1:58 PM        Failed - Last blood pressure under 140/90 in past 12 months     BP Readings from Last 3 Encounters:   09/18/20 134/58   02/24/20 (!) 154/82   12/28/19 137/82                 Failed - Normal serum creatinine on file in past 12 months     Recent Labs   Lab Test 09/18/20  1000   CR 0.56       Ok to refill medication if creatinine is low          Failed - Normal serum potassium on file in past 12 months     Recent Labs   Lab Test 09/18/20  1000   POTASSIUM 3.7                    Passed - Recent (12 mo) or future (30 days) visit within the authorizing provider's specialty     Patient has had an office visit with the authorizing provider or a provider within the authorizing providers department within the previous 12 mos or has a future within next 30 days. See \"Patient Info\" tab in inbasket, or \"Choose Columns\" in Meds & Orders section of the refill encounter.              Passed - Medication is active on med list        Passed - Patient is age 18 or older        Passed - No active pregnancy on record        Passed - No positive pregnancy test in past 12 months       Diuretics (Including Combos) Protocol Failed - 9/20/2021  1:58 PM        Failed - Blood pressure under 140/90 in past 12 months     BP Readings from Last 3 Encounters:   09/18/20 134/58   02/24/20 (!) 154/82   12/28/19 137/82                 Failed - Normal serum creatinine on file in past 12 months     Recent Labs   Lab Test 09/18/20  1000   CR 0.56              Failed - Normal serum potassium on file in past 12 months     Recent Labs   Lab Test 09/18/20  1000   POTASSIUM 3.7                    Failed - Normal serum sodium on file in past 12 months     " "Recent Labs   Lab Test 09/18/20  1000                 Passed - Recent (12 mo) or future (30 days) visit within the authorizing provider's specialty     Patient has had an office visit with the authorizing provider or a provider within the authorizing providers department within the previous 12 mos or has a future within next 30 days. See \"Patient Info\" tab in inbasket, or \"Choose Columns\" in Meds & Orders section of the refill encounter.              Passed - Medication is active on med list        Passed - Patient is age 18 or older        Passed - No active pregancy on record        Passed - No positive pregnancy test in past 12 months             "

## 2021-09-26 ENCOUNTER — HEALTH MAINTENANCE LETTER (OUTPATIENT)
Age: 86
End: 2021-09-26

## 2021-11-21 ENCOUNTER — HEALTH MAINTENANCE LETTER (OUTPATIENT)
Age: 86
End: 2021-11-21

## 2021-12-04 DIAGNOSIS — I10 HYPERTENSION GOAL BP (BLOOD PRESSURE) < 140/90: ICD-10-CM

## 2021-12-05 RX ORDER — LOSARTAN POTASSIUM AND HYDROCHLOROTHIAZIDE 25; 100 MG/1; MG/1
TABLET ORAL
Qty: 90 TABLET | Refills: 0 | OUTPATIENT
Start: 2021-12-05

## 2022-03-08 ENCOUNTER — TRANSFERRED RECORDS (OUTPATIENT)
Dept: HEALTH INFORMATION MANAGEMENT | Facility: CLINIC | Age: 87
End: 2022-03-08

## 2022-03-08 LAB — RETINOPATHY: NEGATIVE

## 2022-05-08 ENCOUNTER — HEALTH MAINTENANCE LETTER (OUTPATIENT)
Age: 87
End: 2022-05-08

## 2022-07-03 ENCOUNTER — HEALTH MAINTENANCE LETTER (OUTPATIENT)
Age: 87
End: 2022-07-03

## 2022-09-24 NOTE — TELEPHONE ENCOUNTER
Okayed refill but patient should see us in the next month in clinic    Please inform patient    Jayden Munoz MD    
Requested Prescriptions   Pending Prescriptions Disp Refills     tiZANidine (ZANAFLEX) 4 MG tablet [Pharmacy Med Name: TIZANIDINE HCL 4 MG Tablet] 90 tablet 1     Sig: TAKE 1 TABLET THREE TIMES DAILY AS NEEDED  FOR  MUSCLE  SPASMS    There is no refill protocol information for this order            Routing refill request to provider for review/approval because:  Drug not on the [facility] [place] protocol       Brittany Moody RN  [facility]      
Requested Prescriptions   Pending Prescriptions Disp Refills     tiZANidine (ZANAFLEX) 4 MG tablet [Pharmacy Med Name: TIZANIDINE HCL 4 MG Tablet] 90 tablet 1     Sig: TAKE 1 TABLET THREE TIMES DAILY AS NEEDED  FOR  MUSCLE  SPASMS    There is no refill protocol information for this order         Last Written Prescription Date:  3/6/18  Last Fill Quantity: 90,   # refills: 1  Last Office Visit: 2/5/18  Future Office visit:       Routing refill request to provider for review/approval because:  Drug not on the [facility], [facility] or [facility] refill protocol or controlled substance         
Spoke with patient and informed, appointment made.  
24-Sep-2022 17:11

## 2023-01-08 ENCOUNTER — HEALTH MAINTENANCE LETTER (OUTPATIENT)
Age: 88
End: 2023-01-08

## 2023-06-02 ENCOUNTER — HEALTH MAINTENANCE LETTER (OUTPATIENT)
Age: 88
End: 2023-06-02

## 2023-10-05 NOTE — TELEPHONE ENCOUNTER
I called and spoke to patient by phone, advised her of change to Rx.   She verbalized no concerns over this.    Tavia Garcia RN  [facility]       [email or web address]

## 2024-02-10 ENCOUNTER — HEALTH MAINTENANCE LETTER (OUTPATIENT)
Age: 89
End: 2024-02-10

## (undated) RX ORDER — REGADENOSON 0.08 MG/ML
INJECTION, SOLUTION INTRAVENOUS
Status: DISPENSED
Start: 2020-01-08